# Patient Record
Sex: MALE | Race: WHITE | NOT HISPANIC OR LATINO
[De-identification: names, ages, dates, MRNs, and addresses within clinical notes are randomized per-mention and may not be internally consistent; named-entity substitution may affect disease eponyms.]

---

## 2020-12-30 ENCOUNTER — APPOINTMENT (OUTPATIENT)
Dept: INTERNAL MEDICINE | Facility: CLINIC | Age: 70
End: 2020-12-30
Payer: MEDICARE

## 2020-12-30 ENCOUNTER — TRANSCRIPTION ENCOUNTER (OUTPATIENT)
Age: 70
End: 2020-12-30

## 2020-12-30 VITALS
BODY MASS INDEX: 25.48 KG/M2 | DIASTOLIC BLOOD PRESSURE: 85 MMHG | OXYGEN SATURATION: 97 % | TEMPERATURE: 98.3 F | HEIGHT: 70 IN | WEIGHT: 178 LBS | HEART RATE: 95 BPM | SYSTOLIC BLOOD PRESSURE: 155 MMHG

## 2020-12-30 PROCEDURE — 36415 COLL VENOUS BLD VENIPUNCTURE: CPT

## 2020-12-30 PROCEDURE — 99205 OFFICE O/P NEW HI 60 MIN: CPT | Mod: 25

## 2020-12-31 NOTE — HISTORY OF PRESENT ILLNESS
[de-identified] : 70 year old male patient acme in to [facility] and address HIV disease and other chronic complaints. \par \par patient works as a theater director.\par \par patient complains about increased waist side.

## 2020-12-31 NOTE — PHYSICAL EXAM
[No JVD] : no jugular venous distention [No Lymphadenopathy] : no lymphadenopathy [No Respiratory Distress] : no respiratory distress  [No Accessory Muscle Use] : no accessory muscle use [Soft] : abdomen soft [Non Tender] : non-tender [Normal] : affect was normal and insight and judgment were intact [de-identified] : large abdominal hernia

## 2020-12-31 NOTE — HEALTH RISK ASSESSMENT
[0] : 2) Feeling down, depressed, or hopeless: Not at all (0) [Patient reported colonoscopy was normal] : Patient reported colonoscopy was normal [[id number]] : 0 [ColonoscopyDate] : 08/2020 [ColonoscopyComments] : normal

## 2021-01-05 ENCOUNTER — TRANSCRIPTION ENCOUNTER (OUTPATIENT)
Age: 71
End: 2021-01-05

## 2021-01-05 LAB
ALBUMIN SERPL ELPH-MCNC: 4.4 G/DL
ALP BLD-CCNC: 102 U/L
ALT SERPL-CCNC: 25 U/L
ANION GAP SERPL CALC-SCNC: 14 MMOL/L
APPEARANCE: ABNORMAL
AST SERPL-CCNC: 28 U/L
BACTERIA: NEGATIVE
BILIRUB SERPL-MCNC: 0.6 MG/DL
BILIRUBIN URINE: NEGATIVE
BLOOD URINE: NEGATIVE
BUN SERPL-MCNC: 9 MG/DL
C TRACH RRNA SPEC QL NAA+PROBE: NOT DETECTED
CALCIUM SERPL-MCNC: 9.8 MG/DL
CD3 CELLS # BLD: 827 /UL
CD3 CELLS NFR BLD: 76 %
CD3+CD4+ CELLS # BLD: 361 /UL
CD3+CD4+ CELLS NFR BLD: 33 %
CD3+CD4+ CELLS/CD3+CD8+ CLL SPEC: 0.83 RATIO
CD3+CD8+ CELLS # SPEC: 434 /UL
CD3+CD8+ CELLS NFR BLD: 40 %
CHLORIDE SERPL-SCNC: 102 MMOL/L
CHOLEST SERPL-MCNC: 149 MG/DL
CO2 SERPL-SCNC: 22 MMOL/L
COLOR: YELLOW
CREAT SERPL-MCNC: 0.88 MG/DL
GLUCOSE QUALITATIVE U: NEGATIVE
GLUCOSE SERPL-MCNC: 90 MG/DL
HCV AB SER QL: NONREACTIVE
HCV S/CO RATIO: 0.14 S/CO
HDLC SERPL-MCNC: 70 MG/DL
HIV1 RNA # SERPL NAA+PROBE: NORMAL
HIV1 RNA # SERPL NAA+PROBE: NORMAL COPIES/ML
HYALINE CASTS: 1 /LPF
KETONES URINE: NORMAL
LDLC SERPL CALC-MCNC: 64 MG/DL
LEUKOCYTE ESTERASE URINE: NEGATIVE
MICROSCOPIC-UA: NORMAL
N GONORRHOEA RRNA SPEC QL NAA+PROBE: NOT DETECTED
NITRITE URINE: NEGATIVE
NONHDLC SERPL-MCNC: 79 MG/DL
PH URINE: 5.5
POTASSIUM SERPL-SCNC: 4.6 MMOL/L
PROT SERPL-MCNC: 6.9 G/DL
PROTEIN URINE: NORMAL
RED BLOOD CELLS URINE: 3 /HPF
SODIUM SERPL-SCNC: 138 MMOL/L
SOURCE AMPLIFICATION: NORMAL
SPECIFIC GRAVITY URINE: 1.01
SQUAMOUS EPITHELIAL CELLS: 0 /HPF
T PALLIDUM AB SER QL IA: NEGATIVE
TRIGL SERPL-MCNC: 76 MG/DL
UROBILINOGEN URINE: ABNORMAL
VIRAL LOAD INTERP: NORMAL
VIRAL LOAD LOG: NORMAL LG COP/ML
WHITE BLOOD CELLS URINE: 1 /HPF

## 2021-01-06 ENCOUNTER — TRANSCRIPTION ENCOUNTER (OUTPATIENT)
Age: 71
End: 2021-01-06

## 2021-01-13 ENCOUNTER — TRANSCRIPTION ENCOUNTER (OUTPATIENT)
Age: 71
End: 2021-01-13

## 2021-01-14 ENCOUNTER — OUTPATIENT (OUTPATIENT)
Dept: OUTPATIENT SERVICES | Facility: HOSPITAL | Age: 71
LOS: 1 days | End: 2021-01-14

## 2021-01-14 ENCOUNTER — RESULT REVIEW (OUTPATIENT)
Age: 71
End: 2021-01-14

## 2021-01-14 ENCOUNTER — APPOINTMENT (OUTPATIENT)
Dept: CT IMAGING | Facility: CLINIC | Age: 71
End: 2021-01-14
Payer: SELF-PAY

## 2021-01-14 PROCEDURE — 75571 CT HRT W/O DYE W/CA TEST: CPT | Mod: 26

## 2021-01-19 ENCOUNTER — APPOINTMENT (OUTPATIENT)
Dept: BARIATRICS | Facility: CLINIC | Age: 71
End: 2021-01-19
Payer: MEDICARE

## 2021-01-19 VITALS
TEMPERATURE: 97.6 F | OXYGEN SATURATION: 98 % | WEIGHT: 175.5 LBS | DIASTOLIC BLOOD PRESSURE: 101 MMHG | BODY MASS INDEX: 25.13 KG/M2 | SYSTOLIC BLOOD PRESSURE: 164 MMHG | HEIGHT: 70 IN | HEART RATE: 99 BPM

## 2021-01-19 PROCEDURE — 99072 ADDL SUPL MATRL&STAF TM PHE: CPT

## 2021-01-19 PROCEDURE — 99204 OFFICE O/P NEW MOD 45 MIN: CPT

## 2021-01-26 LAB
HIV GENOSURE ARCHIVE 1: NORMAL
HIV1 PROVIR DNA RT + PR + IN MUT DET SEQ: NORMAL
HIV1 PROVIRAL DNA GENTYP BLD MC NAR: NORMAL

## 2021-01-28 ENCOUNTER — TRANSCRIPTION ENCOUNTER (OUTPATIENT)
Age: 71
End: 2021-01-28

## 2021-01-29 ENCOUNTER — APPOINTMENT (OUTPATIENT)
Dept: NEUROLOGY | Facility: CLINIC | Age: 71
End: 2021-01-29
Payer: MEDICARE

## 2021-01-29 ENCOUNTER — APPOINTMENT (OUTPATIENT)
Dept: INTERNAL MEDICINE | Facility: CLINIC | Age: 71
End: 2021-01-29
Payer: MEDICARE

## 2021-01-29 VITALS — SYSTOLIC BLOOD PRESSURE: 161 MMHG | DIASTOLIC BLOOD PRESSURE: 89 MMHG

## 2021-01-29 VITALS
HEIGHT: 70 IN | SYSTOLIC BLOOD PRESSURE: 162 MMHG | HEART RATE: 103 BPM | TEMPERATURE: 97.7 F | DIASTOLIC BLOOD PRESSURE: 87 MMHG | BODY MASS INDEX: 25.05 KG/M2 | WEIGHT: 175 LBS | OXYGEN SATURATION: 100 %

## 2021-01-29 VITALS
OXYGEN SATURATION: 99 % | SYSTOLIC BLOOD PRESSURE: 160 MMHG | HEART RATE: 96 BPM | WEIGHT: 175 LBS | HEIGHT: 70 IN | TEMPERATURE: 97.7 F | DIASTOLIC BLOOD PRESSURE: 97 MMHG | BODY MASS INDEX: 25.05 KG/M2

## 2021-01-29 DIAGNOSIS — G43.909 MIGRAINE, UNSPECIFIED, NOT INTRACTABLE, W/OUT STATUS MIGRAINOSUS: ICD-10-CM

## 2021-01-29 PROCEDURE — 99214 OFFICE O/P EST MOD 30 MIN: CPT | Mod: 25

## 2021-01-29 PROCEDURE — 99072 ADDL SUPL MATRL&STAF TM PHE: CPT

## 2021-01-29 PROCEDURE — 36415 COLL VENOUS BLD VENIPUNCTURE: CPT

## 2021-01-29 PROCEDURE — 99204 OFFICE O/P NEW MOD 45 MIN: CPT

## 2021-01-29 NOTE — HISTORY OF PRESENT ILLNESS
[FreeTextEntry1] : \par About 10 years ago he began having symptoms that are triggered by cold.\par \par He and his partner have lived in [place] or [place], flying back and forth.\par \par Usually when there is a storm, or some other large weather pattern change, he will develop symptoms.\par \par They have been calling it an autonomic storm, silent migraine or abdominal migraine.\par \par There is a sense that his sinuses are draining.  He projects up a yellow fluid, thick - no nausea and not exactly vomiting except when more severe.  It may be cupful, odorless.  Rarely any food, does not seem gastric. Not painful.  Does not feel faint/lightheaded.\par Some minimal sound sensitivity.\par \par No history of migraines or other headaches.\par Does not feel time loss or have staring spells.\par \par He may become very fatigued when the weather changes occur.\par \par His MD in [place] prescribed CBD oil and tablets.\par \par BP has been a bit high.\par \par He was on HGH for a while for abdominal pain - Dr. Jenkins found him to have a hernia - diastis recti.\par \par

## 2021-01-29 NOTE — PHYSICAL EXAM
[FreeTextEntry1] : General:\par Constitutional:  Sitting comfortably in NAD.\par Psychiatric: well-groomed, appropriate affect, insight/judgment intact\par Ears, Nose, Throat: no abnormalities, mucus membranes moist\par Mallampati: \par Neck: supple, no lymphadenopathy or nodules palpable\par Neck Circumference:\par Cardiovascular: regular rate and rhythm, normal S1/S2, no murmurs \par Chest: Clear to bases. 	Abdomen: soft, non-tender\par Extremities: no edema, clubbing or cyanosis\par Skin:  no rash or neurocutaneous signs \par \par Cognitive:\par Orientation, language, memory and knowledge screens intact.\par Cranial Nerves:\par II: Full to confrontation; disc margins sharp. III/IV/VI: PERRL EOMF No nystagmus\par V1V2V3: Symmetric, VII: Face appears symmetric VIII: Normal to screening, IX/X: Palate Elevates Symmetrical  XI: Trapezius Symmetric  XII: Tongue midline\par Motor:\par Power: 5/5 throughout, tone: normal x 4 limbs, no tremor \par Sensation:\par Intact to light touch. \par Coordination/Gait:\par Finger-nose-finger intact, normal rapid-alternating movements.\par Fine motor normal with normal rapid finger taps and heel-toe tapping \par Narrow based gait, tandem forward and back ok\par Hops well on both feet, heel and toe walking normal \par Reflexes:\par DTR: 1-2+ symmetric x 4 limbs\par

## 2021-01-30 LAB
ESTRADIOL SERPL-MCNC: 27 PG/ML
TESTOST SERPL-MCNC: 498 NG/DL

## 2021-01-30 RX ORDER — TESTOSTERONE 50 MG/5G
50 MG/5GM GEL TRANSDERMAL
Refills: 0 | Status: DISCONTINUED | COMMUNITY
Start: 2020-12-17 | End: 2021-01-29

## 2021-01-30 NOTE — HISTORY OF PRESENT ILLNESS
[de-identified] : 70 year old male patient came in for med refill of glaucoma and discuss abnormal cardiac calcium scoring indicative of CAD and hypogonadism. \par \par

## 2021-02-03 ENCOUNTER — RESULT REVIEW (OUTPATIENT)
Age: 71
End: 2021-02-03

## 2021-02-03 ENCOUNTER — OUTPATIENT (OUTPATIENT)
Dept: OUTPATIENT SERVICES | Facility: HOSPITAL | Age: 71
LOS: 1 days | End: 2021-02-03

## 2021-02-03 ENCOUNTER — APPOINTMENT (OUTPATIENT)
Dept: CT IMAGING | Facility: CLINIC | Age: 71
End: 2021-02-03
Payer: MEDICARE

## 2021-02-03 ENCOUNTER — TRANSCRIPTION ENCOUNTER (OUTPATIENT)
Age: 71
End: 2021-02-03

## 2021-02-03 PROCEDURE — 74177 CT ABD & PELVIS W/CONTRAST: CPT | Mod: 26

## 2021-02-08 ENCOUNTER — NON-APPOINTMENT (OUTPATIENT)
Age: 71
End: 2021-02-08

## 2021-02-08 ENCOUNTER — APPOINTMENT (OUTPATIENT)
Dept: HEART AND VASCULAR | Facility: CLINIC | Age: 71
End: 2021-02-08
Payer: MEDICARE

## 2021-02-08 VITALS
SYSTOLIC BLOOD PRESSURE: 133 MMHG | BODY MASS INDEX: 25.2 KG/M2 | WEIGHT: 176 LBS | OXYGEN SATURATION: 96 % | HEART RATE: 91 BPM | HEIGHT: 70 IN | DIASTOLIC BLOOD PRESSURE: 80 MMHG

## 2021-02-08 PROCEDURE — 93000 ELECTROCARDIOGRAM COMPLETE: CPT

## 2021-02-08 PROCEDURE — 99204 OFFICE O/P NEW MOD 45 MIN: CPT

## 2021-02-08 PROCEDURE — 93306 TTE W/DOPPLER COMPLETE: CPT

## 2021-02-08 PROCEDURE — 99072 ADDL SUPL MATRL&STAF TM PHE: CPT

## 2021-02-08 NOTE — REASON FOR VISIT
[Initial Evaluation] : an initial evaluation of [Coronary Artery Disease] : coronary artery disease [Hyperlipidemia] : hyperlipidemia [Hypertension] : hypertension [FreeTextEntry1] : 70 year old man presents to initiate care. He has been in good health. About 10 years ago, he had a small embolic event to his left eye. He also had a very abnormal Ca score. In addition, he is taking blood pressure and lipid medications. We are discussing cardiac risk stratification and a further work up at this time.

## 2021-02-08 NOTE — DISCUSSION/SUMMARY
[FreeTextEntry1] : CAD, abnormal CA score will add ASA will also add a stress echo for a further risk stratification\par HTN - CONNIE  and I had an extensive discussion regarding his blood pressure management. Patient will continue taking current medications in addition to maintaining a low Na diet, with periodic b/p checks at home.\par HLD CONNIE and I discussed his lipid panel and individualized target LDL goal. At this point, will do diet and exercise with anticipation of re-evaluating labs in 3-6 months\par Carotid disease will get carotid doppler given history of embolic event

## 2021-02-08 NOTE — PHYSICAL EXAM
[General Appearance - Well Developed] : well developed [Normal Appearance] : normal appearance [Well Groomed] : well groomed [General Appearance - Well Nourished] : well nourished [No Deformities] : no deformities [General Appearance - In No Acute Distress] : no acute distress [Normal Conjunctiva] : the conjunctiva exhibited no abnormalities [Eyelids - No Xanthelasma] : the eyelids demonstrated no xanthelasmas [Normal Oral Mucosa] : normal oral mucosa [No Oral Pallor] : no oral pallor [No Oral Cyanosis] : no oral cyanosis [Normal Jugular Venous A Waves Present] : normal jugular venous A waves present [Normal Jugular Venous V Waves Present] : normal jugular venous V waves present [No Jugular Venous Maldonado A Waves] : no jugular venous maldonado A waves [Heart Rate And Rhythm] : heart rate and rhythm were normal [Heart Sounds] : normal S1 and S2 [Murmurs] : no murmurs present [Respiration, Rhythm And Depth] : normal respiratory rhythm and effort [Exaggerated Use Of Accessory Muscles For Inspiration] : no accessory muscle use [Auscultation Breath Sounds / Voice Sounds] : lungs were clear to auscultation bilaterally [Abdomen Soft] : soft [Abdomen Tenderness] : non-tender [Abdomen Mass (___ Cm)] : no abdominal mass palpated [Abnormal Walk] : normal gait [Gait - Sufficient For Exercise Testing] : the gait was sufficient for exercise testing [Nail Clubbing] : no clubbing of the fingernails [Cyanosis, Localized] : no localized cyanosis [Petechial Hemorrhages (___cm)] : no petechial hemorrhages [Skin Color & Pigmentation] : normal skin color and pigmentation [] : no rash [No Venous Stasis] : no venous stasis [Skin Lesions] : no skin lesions [No Skin Ulcers] : no skin ulcer [No Xanthoma] : no  xanthoma was observed [Affect] : the affect was normal [Oriented To Time, Place, And Person] : oriented to person, place, and time [Mood] : the mood was normal [No Anxiety] : not feeling anxious

## 2021-02-24 ENCOUNTER — TRANSCRIPTION ENCOUNTER (OUTPATIENT)
Age: 71
End: 2021-02-24

## 2021-03-02 ENCOUNTER — APPOINTMENT (OUTPATIENT)
Dept: HEART AND VASCULAR | Facility: CLINIC | Age: 71
End: 2021-03-02
Payer: MEDICARE

## 2021-03-02 ENCOUNTER — TRANSCRIPTION ENCOUNTER (OUTPATIENT)
Age: 71
End: 2021-03-02

## 2021-03-02 VITALS
TEMPERATURE: 98.7 F | HEIGHT: 70 IN | SYSTOLIC BLOOD PRESSURE: 131 MMHG | OXYGEN SATURATION: 98 % | DIASTOLIC BLOOD PRESSURE: 75 MMHG | WEIGHT: 176 LBS | BODY MASS INDEX: 25.2 KG/M2 | HEART RATE: 106 BPM

## 2021-03-02 PROCEDURE — 99214 OFFICE O/P EST MOD 30 MIN: CPT

## 2021-03-02 PROCEDURE — 99072 ADDL SUPL MATRL&STAF TM PHE: CPT

## 2021-03-02 PROCEDURE — 93880 EXTRACRANIAL BILAT STUDY: CPT

## 2021-03-02 PROCEDURE — 93351 STRESS TTE COMPLETE: CPT

## 2021-03-02 NOTE — REASON FOR VISIT
[Follow-Up - Clinic] : a clinic follow-up of [Coronary Artery Disease] : coronary artery disease [Hyperlipidemia] : hyperlipidemia [Hypertension] : hypertension [FreeTextEntry1] : Juarez was unable to tolerate beta blocker. He completed a stress echo and a carotid u/s. Both studies were largely unremarkable. His stress is notable for low exercise time. He was also hypertensive at home and during the stress test. We are discussing medical management;

## 2021-03-02 NOTE — DISCUSSION/SUMMARY
[FreeTextEntry1] : HTN will increase enalapril to BID\par HLD CONNIE and I discussed his lipid panel and individualized target LDL goal. At this point, will do diet and exercise with anticipation of re-evaluating labs in 3-6 months\par CAD to cont ASA and re-evaluate

## 2021-03-03 ENCOUNTER — APPOINTMENT (OUTPATIENT)
Dept: INTERNAL MEDICINE | Facility: CLINIC | Age: 71
End: 2021-03-03
Payer: MEDICARE

## 2021-03-03 VITALS
HEART RATE: 100 BPM | BODY MASS INDEX: 25.2 KG/M2 | SYSTOLIC BLOOD PRESSURE: 126 MMHG | DIASTOLIC BLOOD PRESSURE: 75 MMHG | WEIGHT: 176 LBS | OXYGEN SATURATION: 96 % | TEMPERATURE: 98.4 F | HEIGHT: 70 IN

## 2021-03-03 PROCEDURE — 99213 OFFICE O/P EST LOW 20 MIN: CPT | Mod: 25

## 2021-03-03 PROCEDURE — 36415 COLL VENOUS BLD VENIPUNCTURE: CPT

## 2021-03-03 PROCEDURE — 99072 ADDL SUPL MATRL&STAF TM PHE: CPT

## 2021-03-05 ENCOUNTER — TRANSCRIPTION ENCOUNTER (OUTPATIENT)
Age: 71
End: 2021-03-05

## 2021-03-05 LAB
CANCER AG19-9 SERPL-ACNC: 7 U/ML
CEA SERPL-MCNC: 1 NG/ML
PSA SERPL-MCNC: 316 NG/ML

## 2021-03-05 NOTE — HISTORY OF PRESENT ILLNESS
[de-identified] : 70 year old male patient came in for follow up on abnormal CT finding on thoracic spine. \par patient denies any interval change in health, denies rapid weight loss, change of appetite, fevers/night sweats. Patient got a ct cardiac calcium scoring for cardiac health evaluation and his ct scan has showed: patchy sclerotic foci in thoracic spine which osteoblastic mets cant be excluded. ROS is otherwise negative, patient denies pain in the thoracic spine. \par

## 2021-03-05 NOTE — ASSESSMENT
[FreeTextEntry1] : we had a long conversation about the significance and importance of the finding. Patient is up to date on colonoscopy from sep 2020. Will check for tumor markers today and refer to oncology for further evaluation to r/o underlying malignancy.

## 2021-03-08 ENCOUNTER — TRANSCRIPTION ENCOUNTER (OUTPATIENT)
Age: 71
End: 2021-03-08

## 2021-03-09 ENCOUNTER — NON-APPOINTMENT (OUTPATIENT)
Age: 71
End: 2021-03-09

## 2021-03-11 ENCOUNTER — TRANSCRIPTION ENCOUNTER (OUTPATIENT)
Age: 71
End: 2021-03-11

## 2021-03-12 ENCOUNTER — RX RENEWAL (OUTPATIENT)
Age: 71
End: 2021-03-12

## 2021-03-12 ENCOUNTER — TRANSCRIPTION ENCOUNTER (OUTPATIENT)
Age: 71
End: 2021-03-12

## 2021-03-12 LAB
ALBUMIN MFR SERPL ELPH: 60.3 %
ALBUMIN SERPL-MCNC: 4.4 G/DL
ALBUMIN/GLOB SERPL: 1.5 RATIO
ALBUPE: 21.2 %
ALPHA1 GLOB MFR SERPL ELPH: 4.2 %
ALPHA1 GLOB SERPL ELPH-MCNC: 0.3 G/DL
ALPHA1UPE: 34.4 %
ALPHA2 GLOB MFR SERPL ELPH: 10.1 %
ALPHA2 GLOB SERPL ELPH-MCNC: 0.7 G/DL
ALPHA2UPE: 19.5 %
B-GLOBULIN MFR SERPL ELPH: 10.6 %
B-GLOBULIN SERPL ELPH-MCNC: 0.8 G/DL
BETAUPE: 15.6 %
CREAT 24H UR-MCNC: NORMAL G/24 H
CREATININE UR (MAYO): 218 MG/DL
DEPRECATED KAPPA LC FREE/LAMBDA SER: 1.2 RATIO
GAMMA GLOB FLD ELPH-MCNC: 1.1 G/DL
GAMMA GLOB MFR SERPL ELPH: 14.8 %
GAMMAUPE: 9.3 %
IGA 24H UR QL IFE: NORMAL
IGA SER QL IEP: 299 MG/DL
IGG SER QL IEP: 1010 MG/DL
IGM SER QL IEP: 113 MG/DL
INTERPRETATION SERPL IEP-IMP: NORMAL
KAPPA LC 24H UR QL: NORMAL
KAPPA LC CSF-MCNC: 2.3 MG/DL
KAPPA LC SERPL-MCNC: 2.76 MG/DL
M PROTEIN SPEC IFE-MCNC: NORMAL
PROT PATTERN 24H UR ELPH-IMP: NORMAL
PROT SERPL-MCNC: 7.3 G/DL
PROT SERPL-MCNC: 7.3 G/DL
PROT UR-MCNC: 75 MG/DL
PROT UR-MCNC: 75 MG/DL
SPECIMEN VOL 24H UR: NORMAL ML

## 2021-03-13 ENCOUNTER — TRANSCRIPTION ENCOUNTER (OUTPATIENT)
Age: 71
End: 2021-03-13

## 2021-03-15 ENCOUNTER — APPOINTMENT (OUTPATIENT)
Dept: UROLOGY | Facility: CLINIC | Age: 71
End: 2021-03-15
Payer: MEDICARE

## 2021-03-15 VITALS
HEART RATE: 99 BPM | HEIGHT: 70 IN | WEIGHT: 177 LBS | TEMPERATURE: 97.1 F | SYSTOLIC BLOOD PRESSURE: 148 MMHG | DIASTOLIC BLOOD PRESSURE: 88 MMHG | BODY MASS INDEX: 25.34 KG/M2

## 2021-03-15 DIAGNOSIS — R93.7 ABNORMAL FINDINGS ON DIAGNOSTIC IMAGING OF OTHER PARTS OF MUSCULOSKELETAL SYSTEM: ICD-10-CM

## 2021-03-15 PROCEDURE — 99072 ADDL SUPL MATRL&STAF TM PHE: CPT

## 2021-03-15 PROCEDURE — 99204 OFFICE O/P NEW MOD 45 MIN: CPT

## 2021-03-15 RX ORDER — PROPRANOLOL HYDROCHLORIDE 10 MG/1
10 TABLET ORAL
Qty: 120 | Refills: 1 | Status: DISCONTINUED | COMMUNITY
Start: 2021-01-29 | End: 2021-03-15

## 2021-03-16 PROBLEM — R93.7 ABNORMAL CT OF THORACIC SPINE: Status: ACTIVE | Noted: 2021-03-03

## 2021-03-16 NOTE — ASSESSMENT
[FreeTextEntry1] : 70 year old male with PSA of 316 and abnormal BERNABE\par -presumed advanced or metastatic prostate cancer\par -recheck PSA to confirm \par -CT chest, abdomen, and pelvis reviewed with patient, ? thoracic spine lesion\par -no bone scan performed yet\par -will discuss with oncologist PET scan ordered - ?Axumin \par follow up by phone to coordinate care with medical oncology.

## 2021-03-16 NOTE — END OF VISIT
[FreeTextEntry3] : 69yo male with PSA of 300, ?metastasis to thoracic spine. Needs further staging imaging and tissue diagnosis for confirmation. Discussed possible treatment options, ADT plus secondary hormone therapy, chemo if widespread metastatic disease, possible radiation to the prostate if localized or oligometastatic disease. Will coordinate with Med Onc.

## 2021-03-16 NOTE — HISTORY OF PRESENT ILLNESS
[None] : None [FreeTextEntry1] : This is a 70 year old male with PMHx significant for HIV, HTN, and autonomic storms who is being referred today for elevated PSA.  Most recent PSA was 316 on March 3, 2021.  Patient unaware of any previous values.  \par He reports Testosterone replacement for the past 20 years with Androgel.  Latest Testosterone was 498 on 1/29/21.\par He is concerned today the his past use of Egfirta which he took 1 year ago may have contribute to hie elevated PSA.  He started medication due to patient noticing that abdomen was protruding out more and was diagnosed with lipodystrophy. Medication was stopped ~3 months ago after seeing general surgery and was diagnosed with rectus diastasis.\par CT a/p demonstrated no pelvic lymphadenopathy.\par CT chest showed patchy sclerotic foci in the thoracic spine\par \par Alk Phos 102 in January 2021 was previous 80 in August 2020\par No urinary complaints at this time, no bone pain\par \par Dr. Randolph is oncologist that patient has seeing, plan for PET scan if insurance authorized.  If not then directly to biopsy of spine\par \par Family Hx:maternal aunts with breast cancer x 2 and pancreatic cancer\par  [Urinary Incontinence] : no urinary incontinence [Urinary Retention] : no urinary retention [Urinary Urgency] : no urinary urgency [Urinary Frequency] : no urinary frequency [Nocturia] : no nocturia [Straining] : no straining [Weak Stream] : no weak stream [Intermittency] : no intermittency [Post-Void Dribbling] : no post-void dribbling [Dysuria] : no dysuria [Hematuria - Gross] : no gross hematuria [Abdominal Pain] : no abdominal pain [Flank Pain] : no flank pain [Weight Loss] : no recent ~M [unfilled] weight loss [Fever] : no fever [Fatigue] : no fatigue

## 2021-03-16 NOTE — PHYSICAL EXAM
[General Appearance - Well Developed] : well developed [General Appearance - Well Nourished] : well nourished [Normal Appearance] : normal appearance [Well Groomed] : well groomed [General Appearance - In No Acute Distress] : no acute distress [Edema] : no peripheral edema [Respiration, Rhythm And Depth] : normal respiratory rhythm and effort [Exaggerated Use Of Accessory Muscles For Inspiration] : no accessory muscle use [Abdomen Soft] : soft [Abdomen Tenderness] : non-tender [Costovertebral Angle Tenderness] : no ~M costovertebral angle tenderness [Urethral Meatus] : meatus normal [Penis Abnormality] : normal circumcised penis [Urinary Bladder Findings] : the bladder was normal on palpation [Scrotum] : the scrotum was normal [Epididymis] : the epididymides were normal [Testes Mass (___cm)] : there were no testicular masses [Prostate Tenderness] : the prostate was not tender [Normal Station and Gait] : the gait and station were normal for the patient's age [] : no rash [No Focal Deficits] : no focal deficits [Oriented To Time, Place, And Person] : oriented to person, place, and time [Affect] : the affect was normal [Mood] : the mood was normal [Not Anxious] : not anxious [No Palpable Adenopathy] : no palpable adenopathy [FreeTextEntry1] : firm nodule left side of prostate

## 2021-03-19 ENCOUNTER — TRANSCRIPTION ENCOUNTER (OUTPATIENT)
Age: 71
End: 2021-03-19

## 2021-03-20 ENCOUNTER — INPATIENT (INPATIENT)
Facility: HOSPITAL | Age: 71
LOS: 1 days | Discharge: HOME | DRG: 872 | End: 2021-03-22
Attending: HOSPITALIST | Admitting: HOSPITALIST
Payer: MEDICARE

## 2021-03-20 VITALS
RESPIRATION RATE: 18 BRPM | HEART RATE: 125 BPM | OXYGEN SATURATION: 96 % | DIASTOLIC BLOOD PRESSURE: 101 MMHG | WEIGHT: 175.93 LBS | HEIGHT: 70 IN | TEMPERATURE: 98 F | SYSTOLIC BLOOD PRESSURE: 155 MMHG

## 2021-03-20 DIAGNOSIS — N39.0 URINARY TRACT INFECTION, SITE NOT SPECIFIED: ICD-10-CM

## 2021-03-20 DIAGNOSIS — A41.9 SEPSIS, UNSPECIFIED ORGANISM: ICD-10-CM

## 2021-03-20 DIAGNOSIS — B20 HUMAN IMMUNODEFICIENCY VIRUS [HIV] DISEASE: ICD-10-CM

## 2021-03-20 DIAGNOSIS — I10 ESSENTIAL (PRIMARY) HYPERTENSION: ICD-10-CM

## 2021-03-20 DIAGNOSIS — R63.8 OTHER SYMPTOMS AND SIGNS CONCERNING FOOD AND FLUID INTAKE: ICD-10-CM
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
## 2021-03-20 DIAGNOSIS — H40.9 UNSPECIFIED GLAUCOMA: ICD-10-CM

## 2021-03-20 DIAGNOSIS — I25.10 ATHEROSCLEROTIC HEART DISEASE OF NATIVE CORONARY ARTERY WITHOUT ANGINA PECTORIS: ICD-10-CM

## 2021-03-20 DIAGNOSIS — M89.9 DISORDER OF BONE, UNSPECIFIED: ICD-10-CM

## 2021-03-20 DIAGNOSIS — D72.829 ELEVATED WHITE BLOOD CELL COUNT, UNSPECIFIED: ICD-10-CM

## 2021-03-20 DIAGNOSIS — E87.2 ACIDOSIS: ICD-10-CM

## 2021-03-20 LAB
APPEARANCE UR: CLEAR — SIGNIFICANT CHANGE UP
BACTERIA # UR AUTO: PRESENT /HPF
BILIRUB UR-MCNC: ABNORMAL
COLOR SPEC: YELLOW — SIGNIFICANT CHANGE UP
COMMENT - URINE: SIGNIFICANT CHANGE UP
DIFF PNL FLD: ABNORMAL
EPI CELLS # UR: ABNORMAL /HPF (ref 0–5)
GLUCOSE UR QL: NEGATIVE — SIGNIFICANT CHANGE UP
GRAN CASTS # UR COMP ASSIST: ABNORMAL /LPF
HYALINE CASTS # UR AUTO: ABNORMAL /LPF (ref 0–2)
KETONES UR-MCNC: ABNORMAL MG/DL
LACTATE SERPL-SCNC: 1.8 MMOL/L — SIGNIFICANT CHANGE UP (ref 0.5–2)
LACTATE SERPL-SCNC: 2.2 MMOL/L — HIGH (ref 0.5–2)
LEUKOCYTE ESTERASE UR-ACNC: NEGATIVE — SIGNIFICANT CHANGE UP
NITRITE UR-MCNC: POSITIVE
PH UR: 6.5 — SIGNIFICANT CHANGE UP (ref 5–8)
PROT UR-MCNC: 100 MG/DL
RBC CASTS # UR COMP ASSIST: < 5 /HPF — SIGNIFICANT CHANGE UP
SP GR SPEC: 1.02 — SIGNIFICANT CHANGE UP (ref 1–1.03)
UROBILINOGEN FLD QL: 2 E.U./DL
WBC UR QL: < 5 /HPF — SIGNIFICANT CHANGE UP

## 2021-03-20 PROCEDURE — 70450 CT HEAD/BRAIN W/O DYE: CPT | Mod: 26,MG

## 2021-03-20 PROCEDURE — 72131 CT LUMBAR SPINE W/O DYE: CPT | Mod: 26,MG

## 2021-03-20 PROCEDURE — 93010 ELECTROCARDIOGRAM REPORT: CPT

## 2021-03-20 PROCEDURE — G1004: CPT

## 2021-03-20 PROCEDURE — 99223 1ST HOSP IP/OBS HIGH 75: CPT | Mod: GC

## 2021-03-20 PROCEDURE — 72125 CT NECK SPINE W/O DYE: CPT | Mod: 26,MG

## 2021-03-20 PROCEDURE — 71045 X-RAY EXAM CHEST 1 VIEW: CPT | Mod: 26

## 2021-03-20 PROCEDURE — 99285 EMERGENCY DEPT VISIT HI MDM: CPT

## 2021-03-20 PROCEDURE — 72128 CT CHEST SPINE W/O DYE: CPT | Mod: 26,MG

## 2021-03-20 RX ORDER — SODIUM CHLORIDE 9 MG/ML
1000 INJECTION INTRAMUSCULAR; INTRAVENOUS; SUBCUTANEOUS ONCE
Refills: 0 | Status: COMPLETED | OUTPATIENT
Start: 2021-03-20 | End: 2021-03-20

## 2021-03-20 RX ORDER — ENOXAPARIN SODIUM 100 MG/ML
40 INJECTION SUBCUTANEOUS EVERY 24 HOURS
Refills: 0 | Status: DISCONTINUED | OUTPATIENT
Start: 2021-03-20 | End: 2021-03-22

## 2021-03-20 RX ORDER — ASPIRIN/CALCIUM CARB/MAGNESIUM 324 MG
81 TABLET ORAL DAILY
Refills: 0 | Status: DISCONTINUED | OUTPATIENT
Start: 2021-03-21 | End: 2021-03-22

## 2021-03-20 RX ORDER — MORPHINE SULFATE 50 MG/1
4 CAPSULE, EXTENDED RELEASE ORAL ONCE
Refills: 0 | Status: DISCONTINUED | OUTPATIENT
Start: 2021-03-20 | End: 2021-03-20

## 2021-03-20 RX ORDER — OXYCODONE AND ACETAMINOPHEN 5; 325 MG/1; MG/1
1 TABLET ORAL ONCE
Refills: 0 | Status: DISCONTINUED | OUTPATIENT
Start: 2021-03-20 | End: 2021-03-20

## 2021-03-20 RX ORDER — ATORVASTATIN CALCIUM 80 MG/1
40 TABLET, FILM COATED ORAL AT BEDTIME
Refills: 0 | Status: DISCONTINUED | OUTPATIENT
Start: 2021-03-20 | End: 2021-03-22

## 2021-03-20 RX ORDER — OXYCODONE HYDROCHLORIDE 5 MG/1
5 TABLET ORAL EVERY 4 HOURS
Refills: 0 | Status: DISCONTINUED | OUTPATIENT
Start: 2021-03-20 | End: 2021-03-22

## 2021-03-20 RX ORDER — ACETAMINOPHEN 500 MG
650 TABLET ORAL EVERY 6 HOURS
Refills: 0 | Status: DISCONTINUED | OUTPATIENT
Start: 2021-03-20 | End: 2021-03-22

## 2021-03-20 RX ORDER — ACETAMINOPHEN 500 MG
650 TABLET ORAL ONCE
Refills: 0 | Status: COMPLETED | OUTPATIENT
Start: 2021-03-20 | End: 2021-03-20

## 2021-03-20 RX ORDER — MORPHINE SULFATE 50 MG/1
2 CAPSULE, EXTENDED RELEASE ORAL EVERY 4 HOURS
Refills: 0 | Status: DISCONTINUED | OUTPATIENT
Start: 2021-03-20 | End: 2021-03-22

## 2021-03-20 RX ORDER — CEFTRIAXONE 500 MG/1
1000 INJECTION, POWDER, FOR SOLUTION INTRAMUSCULAR; INTRAVENOUS EVERY 24 HOURS
Refills: 0 | Status: DISCONTINUED | OUTPATIENT
Start: 2021-03-21 | End: 2021-03-21

## 2021-03-20 RX ORDER — CEFTRIAXONE 500 MG/1
1000 INJECTION, POWDER, FOR SOLUTION INTRAMUSCULAR; INTRAVENOUS ONCE
Refills: 0 | Status: COMPLETED | OUTPATIENT
Start: 2021-03-20 | End: 2021-03-20

## 2021-03-20 RX ADMIN — MORPHINE SULFATE 4 MILLIGRAM(S): 50 CAPSULE, EXTENDED RELEASE ORAL at 14:33

## 2021-03-20 RX ADMIN — CEFTRIAXONE 100 MILLIGRAM(S): 500 INJECTION, POWDER, FOR SOLUTION INTRAMUSCULAR; INTRAVENOUS at 16:53

## 2021-03-20 RX ADMIN — SODIUM CHLORIDE 1000 MILLILITER(S): 9 INJECTION INTRAMUSCULAR; INTRAVENOUS; SUBCUTANEOUS at 16:01

## 2021-03-20 RX ADMIN — Medication 650 MILLIGRAM(S): at 16:52

## 2021-03-20 RX ADMIN — SODIUM CHLORIDE 1000 MILLILITER(S): 9 INJECTION INTRAMUSCULAR; INTRAVENOUS; SUBCUTANEOUS at 16:49

## 2021-03-20 RX ADMIN — MORPHINE SULFATE 4 MILLIGRAM(S): 50 CAPSULE, EXTENDED RELEASE ORAL at 15:02

## 2021-03-20 RX ADMIN — ENOXAPARIN SODIUM 40 MILLIGRAM(S): 100 INJECTION SUBCUTANEOUS at 22:27

## 2021-03-20 RX ADMIN — OXYCODONE AND ACETAMINOPHEN 1 TABLET(S): 5; 325 TABLET ORAL at 18:12

## 2021-03-20 NOTE — H&P ADULT - NSHPSOCIALHISTORY_GEN_ALL_CORE
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

## 2021-03-20 NOTE — ED ADULT TRIAGE NOTE - OTHER COMPLAINTS
Pt diagnosed with prostate CA last week, has not started chemo/radiation, also found lesion on the spine.

## 2021-03-20 NOTE — ED PROVIDER NOTE - CLINICAL SUMMARY MEDICAL DECISION MAKING FREE TEXT BOX
69 y/o m hx HTN, HLD, HIV on ART presents c/o fall 2 nights ago with mid and low back pain; pt with no neuro deficits on exam, noted to be tachycardic, rectal temp is 100.4.  Labs show mild leukocytosis, cxr negative.  Blood cultures sent, pending u/a and culture.  CT head, cervical spine neg, CT thoracic and lumbar spine pending result.  Pt comfortable in ED after pain medication, will f/u results of CT, urinalysis, consider admission.

## 2021-03-20 NOTE — ED PROVIDER NOTE - ADMIT DISPOSITION PRESENT ON ADMISSION SEPSIS Q3 - REPEAT LACTATE WAS DRAWN
Airway patent, Nasal mucosa clear. Mouth with normal mucosa. Throat has no vesicles, no oropharyngeal exudates and uvula is midline. Repeat lactate was drawn.

## 2021-03-20 NOTE — ED ADULT NURSE NOTE - OBJECTIVE STATEMENT
Pt reports falling out of bed "face down" on Thursday night twice, as per  pt has hx of sleep walking. Pt's  reports "hearing moaning after he fell," no loc, taking ASA 81. Pt has abrasions to left forehead. Pt also reports lower back pain.  Per Pt's spouse, pt is being evaluated for possible prostate cancer.  PT is currently alert and oriented x3, however, spouse reports he is normally "more alert".  Pt currently denies chest pain, SOB, abd pain, /GI symptoms. D/N/V.

## 2021-03-20 NOTE — ED ADULT NURSE REASSESSMENT NOTE - NS ED NURSE REASSESS COMMENT FT1
PT has two abrasions to forehead, one 3cm x 3cm abrasion to right lower ribs, one 2cm diameter abrasion to left posterior elbow.  NO active bleeding. Denies neck pain. POsitive PMS x4 extremities.  Alert and oriented x3.

## 2021-03-20 NOTE — ED PROVIDER NOTE - OBJECTIVE STATEMENT
71 y/o m hx HTN, HLD, HIV on ART presents c/o fall 2 nights ago.  Pt states he doesn't remember what happened, reports being found on the floor twice during the night by his .   stating he found pt on the floor after hearing a noise, he was face down and had urinated on himself, was able to get pt back into bed although reports a similar incident a few hours later without incontinence during the second event.  Pt stating he was having back pain yesterday which gradually worsened and prompted the ED visit today.  Pt stating he has pain to mid and low back with no improvement after taking NSAIDs and muscle relaxant.  Pt stating he is ambulatory today, but "shaky" on his feet.  Pt and  report pt has had falls in the past, hx sleep walking, told by neurologist after outpatient w/u that he had "autonomic storm" which contributed to his symptoms.  Pt denies numbness to lower ext, weakness, headache, dizziness, CP, SOB, fever, chills, n/v/d, all other ROS negative.

## 2021-03-20 NOTE — H&P ADULT - HISTORY OF PRESENT ILLNESS
HPI:  71 y/o m hx HTN, HLD, HIV on ART presents c/o fall 2 nights ago. Pt states that these falls often happen when he is sleep walking (endorsed by  who is at bedside). Pt has no recollection of walking (states that this has happened to him since he was in grade school), reports being found on the floor twice during the night by his .   stating he found pt on the floor after hearing a noise, he was face down and had urinated on himself, was able to get pt back into bed although reports a similar incident a few hours later without incontinence during the second event.  Pt stating he was having back pain yesterday (which he and  attribute to fall) which gradually worsened and prompted the ED visit today.  Pt stating he has pain to mid and low back with no improvement after taking NSAIDs and muscle relaxant, now improved after medications given in the ED. Pt stating he is ambulatory today, but "shaky" on his feet. No numbness or tingling in legs or feet, no pain in legs. Pt and  report pt has had falls in the past, hx sleep walking, told by neurologist after outpatient w/u that he had "autonomic storm" which contributed to his symptoms. Was briefly on propranolol for this however no longer taking because he became very nauseous and dizzy from it. Denies CP, SOB, fever, chills, n/v/d. Denies blood in urine, bloody bowel movements, or abdominal pain.    Of note, as outpatient, patient found to have elevated PSA to the 300s (reportedly). Was supposed to undergo PET this thursday as part of work-up for c/f prostate CA.     In the ED,  VS: T 98.2, , /101, RR 18, SpO2 96% RA  Labs: notable for WBC 13, Hgb 16, BMP wnl, lactate 2.2, alk phos 125, negative trops  Urine: positive nitrites and WBC  EKG: sinus tachycardia  CXR: no focal consolidation appreciated however no official read  Imaging:  CT thoracic spine: Patchy sclerotic foci involving the mid thoracic vertebra from T6 to T9 as above, suspicious for sclerotic bony metastases.  CT lumbar spine: 1.  No acute fracture or traumatic malalignment. 2.  Severe disc height loss at L5-S1.  CT cspine: 1.  No acute fracture or traumatic malalignment. 2.  Multilevel moderate to severe cervical spondylosis as above.  CT head: No acute intracranial hemorrhage or calvarial fracture.  Orders: 1g ceftriaxone, morphine 4mg, 1 x oxycodone, 2L NS, 1x tylenol   HPI:  69 y/o m hx HTN, HLD, HIV (viral load undetectable in dec 2020 with CD4 count then 361) on ART presents c/o fall 2 nights ago. Pt states that these falls often happen when he is sleep walking (endorsed by  who is at bedside). Pt has no recollection of walking (states that this has happened to him since he was in grade school), reports being found on the floor twice during the night by his .   stating he found pt on the floor after hearing a noise, he was face down and had urinated on himself, was able to get pt back into bed although reports a similar incident a few hours later without incontinence during the second event.  Pt stating he was having back pain yesterday (which he and  attribute to fall) which gradually worsened and prompted the ED visit today.  Pt stating he has pain to mid and low back with no improvement after taking NSAIDs and muscle relaxant, now improved after medications given in the ED. Pt stating he is ambulatory today, but "shaky" on his feet. No numbness or tingling in legs or feet, no pain in legs. Pt and  report pt has had falls in the past, hx sleep walking, told by neurologist after outpatient w/u that he had "autonomic storm" which contributed to his symptoms. Was briefly on propranolol for this however no longer taking because he became very nauseous and dizzy from it. Denies CP, SOB, fever, chills, n/v/d. Denies blood in urine, bloody bowel movements, or abdominal pain.    Of note, as outpatient, patient found to have elevated PSA to the 300s (reportedly). Was supposed to undergo PET this thursday as part of work-up for c/f prostate CA.     In the ED,  VS: T 98.2, , /101, RR 18, SpO2 96% RA  Labs: notable for WBC 13, Hgb 16, BMP wnl, lactate 2.2, alk phos 125, negative trops  Urine: positive nitrites and WBC  EKG: sinus tachycardia  CXR: no focal consolidation appreciated however no official read  Imaging:  CT thoracic spine: Patchy sclerotic foci involving the mid thoracic vertebra from T6 to T9 as above, suspicious for sclerotic bony metastases.  CT lumbar spine: 1.  No acute fracture or traumatic malalignment. 2.  Severe disc height loss at L5-S1.  CT cspine: 1.  No acute fracture or traumatic malalignment. 2.  Multilevel moderate to severe cervical spondylosis as above.  CT head: No acute intracranial hemorrhage or calvarial fracture.  Orders: 1g ceftriaxone, morphine 4mg, 1 x oxycodone, 2L NS, 1x tylenol

## 2021-03-20 NOTE — ED PROVIDER NOTE - MUSCULOSKELETAL, MLM
+diffuse thoracic and lumbar spine tenderness with no overlying crepitus, no ecchymosis, range of motion is not limited, no muscle or joint tenderness

## 2021-03-20 NOTE — H&P ADULT - PROBLEM SELECTOR PLAN 5
Likely 2/2 uti  - s/p 2L NS  - f/u repeat lactate Likely 2/2 UTI, however could also be contributed to if underlying malignancy present. No other obvious sources of infection (CXR clear)  - management of uti as above

## 2021-03-20 NOTE — H&P ADULT - ASSESSMENT
69 y/o male with PMH HTN, HLD, HIV on ART (last CD4 count in 12/2020 361 with undetectable HIV viral load in 12/2020) presents c/o chronic falls, with most recent fall 2 nights ago causing back pain in the mid-back, found to have leukocytosis, elevated lactate, and elevated alk phos, admitted for UTI and gait unsteadiness.

## 2021-03-20 NOTE — H&P ADULT - PROBLEM SELECTOR PLAN 6
home med: enalapril 5mg QD  -c/w enalaprin 5mg QD home med: enalapril 5mg QD  -c/w enalapril 5mg QD Likely 2/2 uti  - s/p 2L NS  - f/u repeat lactate Likely 2/2 uti  - s/p 2L NS, repeat lactate improved  - f/u exam tomorrow to determine whether pt needs fluids however does not currently appear overly dry therefore not continuing maintenance fluids

## 2021-03-20 NOTE — ED PROVIDER NOTE - ATTENDING CONTRIBUTION TO CARE
I discussed the plan of care of the patient directly with the PA and examined the patient while in the Emergency Department. I agree with the HPI and PE as documented by the PA.  Pt has h/o htn, hld, hiv, s/p fall out of bed x 2, two nights ago, does not recall either times.  found him face down on ground, incontinent of urine, with abrasion to forehead. Pt was helped back into bed.  found pt on the floor again a few hrs later. Now c/o pain to mid to lower back. Currently being worked up for prostate ca. No relief w/ ibuprofen. No bowel/ bladder dysfunction. No dizziness, cp, sob, palp. Afebrile. Mildly tachycardic to 120bpm. VS otherwise stable. WNWD m in mild painful distress. + ttp to thoracic and lumbar spine. Neuro exam non-focal. EKG showing sinus tach, no st elev/ dep. Labs wnl including trop. CT c-t-l spine and head pending. Will obtain rectal temp and hydrate with ivf. Will continue to monitor.

## 2021-03-20 NOTE — ED ADULT NURSE NOTE - PMH
Bone lesion    CAD (coronary artery disease)    Glaucoma    HIV (human immunodeficiency virus infection)    Hypertension    Hypogonadism in male    Insomnia    Migraine

## 2021-03-20 NOTE — ED ADULT NURSE NOTE - NSIMPLEMENTINTERV_GEN_ALL_ED
Implemented All Fall Risk Interventions:  [place] to call system. Call bell, personal items and telephone within reach. Instruct patient to call for assistance. Room bathroom lighting operational. Non-slip footwear when patient is off stretcher. Physically safe environment: no spills, clutter or unnecessary equipment. Stretcher in lowest position, wheels locked, appropriate side rails in place. Provide visual cue, wrist band, yellow gown, etc. Monitor gait and stability. Monitor for mental status changes and reorient to person, place, and time. Review medications for side effects contributing to fall risk. Reinforce activity limits and safety measures with patient and family.

## 2021-03-20 NOTE — H&P ADULT - PROBLEM SELECTOR PLAN 1
Pt reports falls in the past, especially when sleep walking, however this has occurred more frequently as of late. DDx includes infection (exacerbation in the setting of now UTI), vs. contribution of potential CA given history of bony les Pt reports falls in the past, especially when sleep walking, however this has occurred more frequently as of late. DDx includes infection (exacerbation in the setting of now UTI), vs. contribution of potential CA given history of bony lesions. potential contribution of neurologic pathology given outpatient history of "autonomic storm" for which pt briefly on propranolol, however, less likely compared to aforementioned etiologies  - fall precautions  - CT cspine, lumbar spine, t-spine without any acute fracture  - pain control: tylenol for mild pain, toradol for moderate pain, percocet for severe pain  - work-up for potential malignancy as below  - treatment of uti as per below  - if not improved with treatment of infection, can consider neuro consult  - PT eval Pt reports falls in the past, especially when sleep walking, however this has occurred more frequently as of late. DDx includes infection (exacerbation in the setting of now UTI), vs. contribution of potential CA given history of bony lesions. potential contribution of neurologic pathology given outpatient history of "autonomic storm" for which pt briefly on propranolol, however, less likely compared to aforementioned etiologies  - fall precautions  - CT cspine, lumbar spine, t-spine without any acute fracture  - pain control: tylenol for mild pain, percocet for moderate pain, morphine 2mg IV for severe pain  - work-up for potential malignancy as below  - treatment of uti as per below  - if not improved with treatment of infection, can consider neuro consult  - PT eval  - f/u TSH, B12, folate, RPR to r/o other causes for fall Pt reports falls in the past, especially when sleep walking, however this has occurred more frequently as of late. DDx includes infection (exacerbation in the setting of now UTI), vs. contribution of potential CA given history of bony lesions. potential contribution of neurologic pathology given outpatient history of "autonomic storm" for which pt briefly on propranolol, however, less likely compared to aforementioned etiologies  - fall precautions  - CT cspine, lumbar spine, t-spine without any acute fracture  - pain control: tylenol for mild pain, oxycodone 5mg q4 for moderate pain, morphine 2mg IV for severe pain  - work-up for potential malignancy as below  - treatment of uti as per below  - if not improved with treatment of infection, can consider neuro consult  - VEEG to r/o seizures  - PT eval  - f/u TSH, B12, folate, RPR to r/o other causes for fall Pt with tachycardia, leukocytosis, and source (positive UA) therefore likely SIRS2/4 positive 2/2 UTI  - management of uti as below (ceftriaxone)  - s/p 2L NS  - repeat lactate 1.2 therefore no longer need to trend  - f/u cxr official read to r/o other source of infection (looks clear)

## 2021-03-20 NOTE — H&P ADULT - NSHPLABSRESULTS_GEN_ALL_CORE
LABS:                         16.1   13.98 )-----------( 215      ( 20 Mar 2021 13:30 )             48.3         141  |  103  |  14  ----------------------------<  186<H>  3.8   |  23  |  0.78    Ca    9.4      20 Mar 2021 13:30    TPro  7.4  /  Alb  4.3  /  TBili  1.2  /  DBili  x   /  AST  40  /  ALT  28  /  AlkPhos  125<H>  0320    PT/INR - ( 20 Mar 2021 13:30 )   PT: 14.7 sec;   INR: 1.24          PTT - ( 20 Mar 2021 13:30 )  PTT:29.7 sec  Urinalysis Basic - ( 20 Mar 2021 17:20 )    Color: Yellow / Appearance: Clear / S.025 / pH: x  Gluc: x / Ketone: Trace mg/dL  / Bili: Small / Urobili: 2.0 E.U./dL   Blood: x / Protein: 100 mg/dL / Nitrite: POSITIVE   Leuk Esterase: NEGATIVE / RBC: < 5 /HPF / WBC < 5 /HPF   Sq Epi: x / Non Sq Epi: 5-10 /HPF / Bacteria: Present /HPF      CARDIAC MARKERS ( 20 Mar 2021 13:30 )  x     / 0.01 ng/mL / 171 U/L / x     / 1.2 ng/mL        Lactate, Blood: 1.8 mmol/L ( @ 18:43)  Lactate, Blood: 2.2 mmol/L ( @ 17:06)      RADIOLOGY, EKG & ADDITIONAL TESTS: Reviewed.

## 2021-03-20 NOTE — H&P ADULT - PROBLEM SELECTOR PLAN 3
Pt with no urinary symptoms (no dysuria, hematuria, increased urinary frequency), however tachycardic with leukocytosis and positive nitrites and WBC in urine  - s/p 1g ceftriaxone in ED  - c/w ceftriaxone pending urine culture  - f/u BCx  - f/u repeat lactate Pt with history of lesions found on outpatient imaging in spine that were concerning for possible prostate cancer especially in setting of elevated PSA to the 300s.  - pt scheduled for PET scan outpatient on thursday  - being followed as outpatient, collateral from outpatient physician with regard to PSA  - pain control as above

## 2021-03-20 NOTE — ED ADULT TRIAGE NOTE - CHIEF COMPLAINT QUOTE
Pt reports falling out of bed "face down" on Thursday night twice, as per  pt has hx of sleep walking. Pt's  reports "hearing moaning after he fell," no loc, taking ASA 81. Pt has abrasions to left forehead. Pt also reports lower back pain.

## 2021-03-20 NOTE — H&P ADULT - NSICDXPASTMEDICALHX_GEN_ALL_CORE_FT
PAST MEDICAL HISTORY:  Bone lesion     CAD (coronary artery disease)     Glaucoma     HIV (human immunodeficiency virus infection)     Hypertension     Hypogonadism in male     Insomnia     Migraine

## 2021-03-20 NOTE — H&P ADULT - PROBLEM SELECTOR PLAN 7
home med: rosuvastatin 10mg   - c/w home med home med: enalapril 5mg QD  -c/w enalapril 5mg QD    #CAD- home med rousuvastain 10mg QD, asa 81 QD  -c/w home meds home med: enalapril 5mg QD  -c/w enalapril 5mg QD    ADDENDUM: see [facility] cardiology note and per patient dose increased to bid, may need second agent if BP remains elevated, however will hold on adding a second agent o/n in setting of sepsis.     #CAD- home med rousuvastain 10mg QD, asa 81 QD  -c/w home meds

## 2021-03-20 NOTE — H&P ADULT - PROBLEM SELECTOR PLAN 2
Pt with history of lesions found on outpatient imaging in spine that were concerning for possible prostate cancer especially in setting of elevated PSA to the 300s.  - pt scheduled for PET scan outpatient on thursday  - being followed as outpatient, collateral from outpatient physician with regard to PSA  - pain control as above Pt with tachycardia, leukocytosis, and source (positive UA) therefore likely SIRS2/4 positive 2/2 UTI  - management of uti as below (ceftriaxone)  - s/p 2L NS  - f/u repeat lactate  - f/u cxr official read to r/o other source of infection Pt with tachycardia, leukocytosis, and source (positive UA) therefore likely SIRS2/4 positive 2/2 UTI  - management of uti as below (ceftriaxone)  - s/p 2L NS  - repeat lactate 1.2 therefore no longer need to trend  - f/u cxr official read to r/o other source of infection (looks clear) Pt with no urinary symptoms (no dysuria, hematuria, increased urinary frequency), however tachycardic with leukocytosis and positive nitrites and WBC in urine  - s/p 1g ceftriaxone in ED  - c/w ceftriaxone pending urine culture  - f/u BCx  - f/u repeat lactate

## 2021-03-20 NOTE — H&P ADULT - ATTENDING COMMENTS
reviewed pertinent data , h&p, [facility] notes   patient seen and examined overnight     1. sepsis/ UTI   2. bone lesion concern for malgnancy:        rest of  plan as above reviewed pertinent data , h&p, [facility] notes   patient seen and examined overnight     1. sepsis/ UTI : on ceftriaxone, followup ctxs   2. bone lesion concern for malgnancy: pt seen by PCP, urology and oncologist for bony lesion per [facility] and patient; to undergo PET scan for further workup        rest of  plan as above reviewed pertinent data , h&p, [facility] notes   patient seen and examined overnight     PE findings as above     1. sepsis/ UTI : on ceftriaxone, followup ctxs     2. bone lesion concern for malgnancy: pt seen by PCP, urology and oncologist for bony lesion per [facility] and patient; to undergo PET scan for further workup; suspect from prosatate given PSA elevation    3. frequent falls: cause unclear, PT evaluation pending; agree w/ VEEG, reports childhood seizure once as well sleep walking episodes as a child; has seen neurology started on propranolol for autonomic sxs, which pt stopped 2/2 feeling not well on medication. will likely need further evaluation by neurology likely as outpatient      rest of  plan as above

## 2021-03-20 NOTE — H&P ADULT - PROBLEM SELECTOR PLAN 4
Likely 2/2 UTI, however could also be contributed to if underlying malignancy present. No other obvious sources of infection (CXR clear)  - management of uti as above  - f/u chest xray final read Pt with no urinary symptoms (no dysuria, hematuria, increased urinary frequency), however tachycardic with leukocytosis and positive nitrites and WBC in urine  - s/p 1g ceftriaxone in ED  - c/w ceftriaxone pending urine culture  - f/u BCx  - f/u repeat lactate Pt reports falls in the past, especially when sleep walking, however this has occurred more frequently as of late. DDx includes infection (exacerbation in the setting of now UTI), vs. contribution of potential CA given history of bony lesions. potential contribution of neurologic pathology given outpatient history of "autonomic storm" for which pt briefly on propranolol, however, less likely compared to aforementioned etiologies  - fall precautions  - CT cspine, lumbar spine, t-spine without any acute fracture  - pain control: tylenol for mild pain, oxycodone 5mg q4 for moderate pain, morphine 2mg IV for severe pain  - work-up for potential malignancy as below  - treatment of uti as per below  - if not improved with treatment of infection, can consider neuro consult  - VEEG to r/o seizures  - PT eval  - f/u TSH, B12, folate, RPR to r/o other causes for fall

## 2021-03-20 NOTE — H&P ADULT - NSHPPHYSICALEXAM_GEN_ALL_CORE
VITAL SIGNS:  T(C): 36.9 (03-20-21 @ 18:28), Max: 38 (03-20-21 @ 16:12)  T(F): 98.5 (03-20-21 @ 18:28), Max: 100.4 (03-20-21 @ 16:12)  HR: 120 (03-20-21 @ 18:28) (119 - 125)  BP: 187/110 (03-20-21 @ 18:28) (148/84 - 187/110)  BP(mean): --  RR: 18 (03-20-21 @ 18:28) (18 - 18)  SpO2: 98% (03-20-21 @ 18:28) (96% - 98%)  Wt(kg): --    PHYSICAL EXAM:    Constitutional: WDWN resting comfortably in bed; NAD  Head: NC/AT with exception of superficial laceration on L forehead  Eyes: PERRL, EOMI, clear conjunctiva, L eye slightly more drooped than R which is baseline per  at bedside  ENT: no nasal discharge; MMM  Neck: supple; no JVD  Respiratory: CTA B/L; no W/R/R, no retractions  Cardiac: +S1/S2; regular rhythm, tachycardic, no M/R/G  Gastrointestinal: soft, NT, distended and slightly tense however no rebound or guarding; +BSx4, pt states abd has been distended increasingly since 2018 and has known diastasis recti  Back: spine midline, no bony tenderness or step-offs; no CVAT B/L  Extremities: WWP, no clubbing or cyanosis; no peripheral edema  Musculoskeletal: NROM x4; no joint swelling, tenderness or erythema  Vascular: 2+ radial, PT pulses B/L  Dermatologic: skin warm, dry and intact; no rashes, wounds, or scars (with exception of forehead)  Neurologic: AAOx3; CNII-XII grossly intact; no focal deficits  Psychiatric: affect and characteristics of appearance, verbalizations, behaviors are appropriate VITAL SIGNS:  T(C): 36.9 (03-20-21 @ 18:28), Max: 38 (03-20-21 @ 16:12)  T(F): 98.5 (03-20-21 @ 18:28), Max: 100.4 (03-20-21 @ 16:12)  HR: 120 (03-20-21 @ 18:28) (119 - 125)  BP: 187/110 (03-20-21 @ 18:28) (148/84 - 187/110)  BP(mean): --  RR: 18 (03-20-21 @ 18:28) (18 - 18)  SpO2: 98% (03-20-21 @ 18:28) (96% - 98%)  Wt(kg): --    PHYSICAL EXAM:    Constitutional: WDWN resting comfortably in bed; NAD  Head: NC/AT with exception of superficial laceration on L forehead  Eyes: PERRL, EOMI, clear conjunctiva, L eye slightly more drooped than R which is baseline per  at bedside  ENT: no nasal discharge; MMM  Neck: supple; no JVD  Respiratory: CTA B/L; no W/R/R, no retractions  Cardiac: +S1/S2; regular rhythm, tachycardic, no M/R/G  Gastrointestinal: soft, NT, distended and slightly tense however no rebound or guarding; +BSx4, pt states abd has been distended increasingly since 2018 and has known diastasis recti  Back: spine midline, no bony tenderness or step-offs; no CVAT B/L  Extremities: WWP, no clubbing or cyanosis; no peripheral edema  Musculoskeletal: NROM x4; no joint swelling, tenderness or erythema  Vascular: 2+ radial, PT pulses B/L  Dermatologic: skin warm, dry and intact; no rashes, wounds, or scars (with exception of forehead)  Neurologic: AAOx3; CNII-XII grossly intact; no focal deficits, strength 5/5 in lower ext and upper ext  Psychiatric: affect and characteristics of appearance, verbalizations, behaviors are appropriate

## 2021-03-21 DIAGNOSIS — J90 PLEURAL EFFUSION, NOT ELSEWHERE CLASSIFIED: ICD-10-CM

## 2021-03-21 LAB
ANION GAP SERPL CALC-SCNC: 15 MMOL/L — SIGNIFICANT CHANGE UP (ref 5–17)
BUN SERPL-MCNC: 10 MG/DL — SIGNIFICANT CHANGE UP (ref 7–23)
CALCIUM SERPL-MCNC: 9.4 MG/DL — SIGNIFICANT CHANGE UP (ref 8.4–10.5)
CHLORIDE SERPL-SCNC: 102 MMOL/L — SIGNIFICANT CHANGE UP (ref 96–108)
CO2 SERPL-SCNC: 23 MMOL/L — SIGNIFICANT CHANGE UP (ref 22–31)
CREAT SERPL-MCNC: 0.73 MG/DL — SIGNIFICANT CHANGE UP (ref 0.5–1.3)
D DIMER BLD IA.RAPID-MCNC: 3616 NG/ML DDU — HIGH
FOLATE SERPL-MCNC: 5.4 NG/ML — SIGNIFICANT CHANGE UP
GLUCOSE SERPL-MCNC: 128 MG/DL — HIGH (ref 70–99)
HCT VFR BLD CALC: 45.4 % — SIGNIFICANT CHANGE UP (ref 39–50)
HCV AB S/CO SERPL IA: 0.13 S/CO — SIGNIFICANT CHANGE UP
HCV AB SERPL-IMP: SIGNIFICANT CHANGE UP
HGB BLD-MCNC: 14.8 G/DL — SIGNIFICANT CHANGE UP (ref 13–17)
MAGNESIUM SERPL-MCNC: 1.8 MG/DL — SIGNIFICANT CHANGE UP (ref 1.6–2.6)
MCHC RBC-ENTMCNC: 31.8 PG — SIGNIFICANT CHANGE UP (ref 27–34)
MCHC RBC-ENTMCNC: 32.6 GM/DL — SIGNIFICANT CHANGE UP (ref 32–36)
MCV RBC AUTO: 97.4 FL — SIGNIFICANT CHANGE UP (ref 80–100)
NRBC # BLD: 0 /100 WBCS — SIGNIFICANT CHANGE UP (ref 0–0)
PHOSPHATE SERPL-MCNC: 2 MG/DL — LOW (ref 2.5–4.5)
PLATELET # BLD AUTO: 168 K/UL — SIGNIFICANT CHANGE UP (ref 150–400)
POTASSIUM SERPL-MCNC: 4.2 MMOL/L — SIGNIFICANT CHANGE UP (ref 3.5–5.3)
POTASSIUM SERPL-SCNC: 4.2 MMOL/L — SIGNIFICANT CHANGE UP (ref 3.5–5.3)
RBC # BLD: 4.66 M/UL — SIGNIFICANT CHANGE UP (ref 4.2–5.8)
RBC # FLD: 13.1 % — SIGNIFICANT CHANGE UP (ref 10.3–14.5)
SODIUM SERPL-SCNC: 140 MMOL/L — SIGNIFICANT CHANGE UP (ref 135–145)
TSH SERPL-MCNC: 0.73 UIU/ML — SIGNIFICANT CHANGE UP (ref 0.35–4.94)
VIT B12 SERPL-MCNC: 902 PG/ML — SIGNIFICANT CHANGE UP (ref 232–1245)
WBC # BLD: 14.77 K/UL — HIGH (ref 3.8–10.5)
WBC # FLD AUTO: 14.77 K/UL — HIGH (ref 3.8–10.5)

## 2021-03-21 PROCEDURE — 71275 CT ANGIOGRAPHY CHEST: CPT | Mod: 26

## 2021-03-21 PROCEDURE — 93970 EXTREMITY STUDY: CPT | Mod: 26

## 2021-03-21 PROCEDURE — 99223 1ST HOSP IP/OBS HIGH 75: CPT | Mod: GC

## 2021-03-21 PROCEDURE — 99233 SBSQ HOSP IP/OBS HIGH 50: CPT | Mod: GC

## 2021-03-21 RX ORDER — CEFTRIAXONE 500 MG/1
1000 INJECTION, POWDER, FOR SOLUTION INTRAMUSCULAR; INTRAVENOUS EVERY 24 HOURS
Refills: 0 | Status: DISCONTINUED | OUTPATIENT
Start: 2021-03-21 | End: 2021-03-22

## 2021-03-21 RX ORDER — RILPIVIRINE HYDROCHLORIDE 25 MG/1
25 TABLET, FILM COATED ORAL DAILY
Refills: 0 | Status: DISCONTINUED | OUTPATIENT
Start: 2021-03-21 | End: 2021-03-21

## 2021-03-21 RX ORDER — RILPIVIRINE HYDROCHLORIDE 25 MG/1
25 TABLET, FILM COATED ORAL AT BEDTIME
Refills: 0 | Status: DISCONTINUED | OUTPATIENT
Start: 2021-03-21 | End: 2021-03-22

## 2021-03-21 RX ORDER — DOLUTEGRAVIR SODIUM 25 MG/1
50 TABLET, FILM COATED ORAL AT BEDTIME
Refills: 0 | Status: DISCONTINUED | OUTPATIENT
Start: 2021-03-21 | End: 2021-03-22

## 2021-03-21 RX ORDER — DOLUTEGRAVIR SODIUM 25 MG/1
50 TABLET, FILM COATED ORAL DAILY
Refills: 0 | Status: DISCONTINUED | OUTPATIENT
Start: 2021-03-21 | End: 2021-03-21

## 2021-03-21 RX ORDER — KETOROLAC TROMETHAMINE 30 MG/ML
15 SYRINGE (ML) INJECTION ONCE
Refills: 0 | Status: DISCONTINUED | OUTPATIENT
Start: 2021-03-21 | End: 2021-03-21

## 2021-03-21 RX ADMIN — Medication 81 MILLIGRAM(S): at 14:23

## 2021-03-21 RX ADMIN — Medication 5 MILLIGRAM(S): at 05:32

## 2021-03-21 RX ADMIN — OXYCODONE HYDROCHLORIDE 5 MILLIGRAM(S): 5 TABLET ORAL at 08:18

## 2021-03-21 RX ADMIN — MORPHINE SULFATE 2 MILLIGRAM(S): 50 CAPSULE, EXTENDED RELEASE ORAL at 10:31

## 2021-03-21 RX ADMIN — OXYCODONE HYDROCHLORIDE 5 MILLIGRAM(S): 5 TABLET ORAL at 22:07

## 2021-03-21 RX ADMIN — OXYCODONE HYDROCHLORIDE 5 MILLIGRAM(S): 5 TABLET ORAL at 23:07

## 2021-03-21 RX ADMIN — ENOXAPARIN SODIUM 40 MILLIGRAM(S): 100 INJECTION SUBCUTANEOUS at 21:15

## 2021-03-21 RX ADMIN — ATORVASTATIN CALCIUM 40 MILLIGRAM(S): 80 TABLET, FILM COATED ORAL at 21:15

## 2021-03-21 RX ADMIN — OXYCODONE HYDROCHLORIDE 5 MILLIGRAM(S): 5 TABLET ORAL at 09:18

## 2021-03-21 RX ADMIN — CEFTRIAXONE 100 MILLIGRAM(S): 500 INJECTION, POWDER, FOR SOLUTION INTRAMUSCULAR; INTRAVENOUS at 16:42

## 2021-03-21 RX ADMIN — Medication 5 MILLIGRAM(S): at 18:15

## 2021-03-21 RX ADMIN — MORPHINE SULFATE 2 MILLIGRAM(S): 50 CAPSULE, EXTENDED RELEASE ORAL at 10:45

## 2021-03-21 RX ADMIN — Medication 62.5 MILLIMOLE(S): at 14:25

## 2021-03-21 NOTE — PHYSICAL THERAPY INITIAL EVALUATION ADULT - PERTINENT HX OF CURRENT PROBLEM, REHAB EVAL
69 y/o m hx HTN, HLD, HIV (viral load undetectable in dec 2020 with CD4 count then 361) on ART presents c/o fall 2 nights ago. Pt states that these falls often happen when he is sleep walking (endorsed by  who is at bedside). Pt has no recollection of walking (states that this has happened to him since he was in grade school), reports being found on the floor twice during the night by his . Refer to H&P for further details.

## 2021-03-21 NOTE — PHYSICAL THERAPY INITIAL EVALUATION ADULT - ADDITIONAL COMMENTS
Pt lives in a loft apartment with . Pt denies use of DME prior to admission. Pt states that to falls only happening when he is "sleep walking"

## 2021-03-21 NOTE — CONSULT NOTE ADULT - PROBLEM SELECTOR RECOMMENDATION 2
as above.     PLAN:   - plan for thoracentesis tomorrow afternoon. no need for NPO.   - will send PSA levels as well from pleural effusion. ([email or web address]).     The patient will be s/e/d/ with attending.

## 2021-03-21 NOTE — PROGRESS NOTE ADULT - SUBJECTIVE AND OBJECTIVE BOX
1Patient is a 70y old  Male who presents with a chief complaint of back pain, uti (20 Mar 2021 19:10)      INTERVAL HPI/OVERNIGHT EVENTS:  Patient was seen and examined at bedside. Endorses significant back pain, brianne with any movement. Notes he was supposed to follow up with Pet scan, however came in 2/2 fall. Patient denies: fever, chills, dizziness, weakness, HA, Changes in vision, CP, palpitations, SOB, cough, N/V/D/C, LE edema.      PAST MEDICAL & SURGICAL HISTORY:  Bone lesion    Hypertension    CAD (coronary artery disease)    Hypogonadism in male    Glaucoma    Migraine    Insomnia    HIV (human immunodeficiency virus infection)    No significant past surgical history        T(C): 37 (21 @ 10:26), Max: 38 (21 @ 16:12)  HR: 112 (21 @ 11:01) (109 - 120)  BP: 157/81 (21 @ 11:01) (148/84 - 190/91)  RR: 20 (21 @ 11:01) (17 - 24)  SpO2: 95% (21 @ 11:01) (95% - 98%)  Wt(kg): --  I&O's Summary      PHYSICAL EXAM:  GENERAL: NAD, slightly uncomfortable laying in bed  HEAD:  +L forehead lac, NT to touch, no oozing  EYES: EOMI, PERRLA, L eye slightly more drooped than R which is baseline   ENMT: No tonsillar erythema, exudates, or enlargement; MMM  NECK: Supple, No JVD  NERVOUS SYSTEM:  Alert & Oriented X3, no focal deficits   CHEST/LUNG: Tachypneic, Clear to percussion bilaterally; No rales, rhonchi, wheezing, or rubs  HEART: Tachycardic, Regular rhythm; No murmurs, rubs, or gallops  ABDOMEN: Soft, Nontender, +mild distension; Bowel sounds present  EXTREMITIES:  2+ Peripheral Pulses, No clubbing, cyanosis, or edema  LYMPH: No lymphadenopathy noted      LABS:                        14.8   14.77 )-----------( 168      ( 21 Mar 2021 07:54 )             45.4         140  |  102  |  10  ----------------------------<  128<H>  4.2   |  23  |  0.73    Ca    9.4      21 Mar 2021 07:54  Phos  2.0     -  Mg     1.8     -    TPro  7.4  /  Alb  4.3  /  TBili  1.2  /  DBili  x   /  AST  40  /  ALT  28  /  AlkPhos  125<H>  -20    PT/INR - ( 20 Mar 2021 13:30 )   PT: 14.7 sec;   INR: 1.24          PTT - ( 20 Mar 2021 13:30 )  PTT:29.7 sec  Urinalysis Basic - ( 20 Mar 2021 17:20 )    Color: Yellow / Appearance: Clear / S.025 / pH: x  Gluc: x / Ketone: Trace mg/dL  / Bili: Small / Urobili: 2.0 E.U./dL   Blood: x / Protein: 100 mg/dL / Nitrite: POSITIVE   Leuk Esterase: NEGATIVE / RBC: < 5 /HPF / WBC < 5 /HPF   Sq Epi: x / Non Sq Epi: 5-10 /HPF / Bacteria: Present /HPF      CAPILLARY BLOOD GLUCOSE            Urinalysis Basic - ( 20 Mar 2021 17:20 )    Color: Yellow / Appearance: Clear / S.025 / pH: x  Gluc: x / Ketone: Trace mg/dL  / Bili: Small / Urobili: 2.0 E.U./dL   Blood: x / Protein: 100 mg/dL / Nitrite: POSITIVE   Leuk Esterase: NEGATIVE / RBC: < 5 /HPF / WBC < 5 /HPF   Sq Epi: x / Non Sq Epi: 5-10 /HPF / Bacteria: Present /HPF        MEDICATIONS  (STANDING):  aspirin enteric coated 81 milliGRAM(s) Oral daily  atorvastatin 40 milliGRAM(s) Oral at bedtime  cefTRIAXone   IVPB 1000 milliGRAM(s) IV Intermittent every 24 hours  dolutegravir 50 milliGRAM(s) Oral at bedtime  enalapril 5 milliGRAM(s) Oral two times a day  enoxaparin Injectable 40 milliGRAM(s) SubCutaneous every 24 hours  rilpivirine 25 milliGRAM(s) Oral at bedtime  sodium phosphate IVPB 15 milliMole(s) IV Intermittent once    MEDICATIONS  (PRN):  acetaminophen   Tablet .. 650 milliGRAM(s) Oral every 6 hours PRN Mild Pain (1 - 3)  morphine  - Injectable 2 milliGRAM(s) IV Push every 4 hours PRN Severe Pain (7 - 10)  oxyCODONE    IR 5 milliGRAM(s) Oral every 4 hours PRN Moderate Pain (4 - 6)      RADIOLOGY & ADDITIONAL TESTS:    Imaging Personally Reviewed:  [ ] YES  [ ] NO    Consultant(s) Notes Reviewed:  [ ] YES  [ ] NO    Care Discussed with Consultants/Other Providers [ ] YES  [ ] NO

## 2021-03-21 NOTE — CONSULT NOTE ADULT - PROBLEM SELECTOR RECOMMENDATION 9
On CT chest, patient has b/l pleural effusions almost 3 cm in depth.   on POCUS patient has effusions b/l almost 5-6 cm in depth, simple appearing with no internal septations/echogenic debris. on Ct chest 2 months ago, no effusions were noted.   d/d include malignancy vs CHF exacerbation vs infectious.   Given patient's PSA > 300 and osteoblastic bone lesion high suspicion of metastatic prostate cancer and we need to r/o prostate cancer as etiology for pleural effusion, although unusual for prostate cancer to cause pleural effusions.   No signs of volume overload or pneumonia clinically or on imaging

## 2021-03-22 ENCOUNTER — TRANSCRIPTION ENCOUNTER (OUTPATIENT)
Age: 71
End: 2021-03-22

## 2021-03-22 ENCOUNTER — NON-APPOINTMENT (OUTPATIENT)
Age: 71
End: 2021-03-22

## 2021-03-22 VITALS
HEART RATE: 106 BPM | SYSTOLIC BLOOD PRESSURE: 165 MMHG | DIASTOLIC BLOOD PRESSURE: 88 MMHG | OXYGEN SATURATION: 97 % | RESPIRATION RATE: 18 BRPM | TEMPERATURE: 98 F

## 2021-03-22 PROBLEM — E29.1 TESTICULAR HYPOFUNCTION: Chronic | Status: ACTIVE | Noted: 2021-03-20

## 2021-03-22 PROBLEM — G47.00 INSOMNIA, UNSPECIFIED: Chronic | Status: ACTIVE | Noted: 2021-03-20

## 2021-03-22 PROBLEM — I25.10 ATHEROSCLEROTIC HEART DISEASE OF NATIVE CORONARY ARTERY WITHOUT ANGINA PECTORIS: Chronic | Status: ACTIVE | Noted: 2021-03-20

## 2021-03-22 PROBLEM — H40.9 UNSPECIFIED GLAUCOMA: Chronic | Status: ACTIVE | Noted: 2021-03-20

## 2021-03-22 PROBLEM — M89.9 DISORDER OF BONE, UNSPECIFIED: Chronic | Status: ACTIVE | Noted: 2021-03-20

## 2021-03-22 PROBLEM — I10 ESSENTIAL (PRIMARY) HYPERTENSION: Chronic | Status: ACTIVE | Noted: 2021-03-20

## 2021-03-22 PROBLEM — B20 HUMAN IMMUNODEFICIENCY VIRUS [HIV] DISEASE: Chronic | Status: ACTIVE | Noted: 2021-03-20

## 2021-03-22 PROBLEM — G43.909 MIGRAINE, UNSPECIFIED, NOT INTRACTABLE, WITHOUT STATUS MIGRAINOSUS: Chronic | Status: ACTIVE | Noted: 2021-03-20

## 2021-03-22 LAB
ANION GAP SERPL CALC-SCNC: 14 MMOL/L — SIGNIFICANT CHANGE UP (ref 5–17)
APTT BLD: 31.5 SEC — SIGNIFICANT CHANGE UP (ref 27.5–35.5)
BUN SERPL-MCNC: 10 MG/DL — SIGNIFICANT CHANGE UP (ref 7–23)
CALCIUM SERPL-MCNC: 9.3 MG/DL — SIGNIFICANT CHANGE UP (ref 8.4–10.5)
CHLORIDE SERPL-SCNC: 99 MMOL/L — SIGNIFICANT CHANGE UP (ref 96–108)
CO2 SERPL-SCNC: 22 MMOL/L — SIGNIFICANT CHANGE UP (ref 22–31)
CREAT SERPL-MCNC: 0.71 MG/DL — SIGNIFICANT CHANGE UP (ref 0.5–1.3)
CULTURE RESULTS: NO GROWTH — SIGNIFICANT CHANGE UP
GLUCOSE SERPL-MCNC: 104 MG/DL — HIGH (ref 70–99)
HCT VFR BLD CALC: 44.1 % — SIGNIFICANT CHANGE UP (ref 39–50)
HGB BLD-MCNC: 14.1 G/DL — SIGNIFICANT CHANGE UP (ref 13–17)
INR BLD: 1.09 — SIGNIFICANT CHANGE UP (ref 0.88–1.16)
MAGNESIUM SERPL-MCNC: 1.8 MG/DL — SIGNIFICANT CHANGE UP (ref 1.6–2.6)
MCHC RBC-ENTMCNC: 31.3 PG — SIGNIFICANT CHANGE UP (ref 27–34)
MCHC RBC-ENTMCNC: 32 GM/DL — SIGNIFICANT CHANGE UP (ref 32–36)
MCV RBC AUTO: 98 FL — SIGNIFICANT CHANGE UP (ref 80–100)
NRBC # BLD: 0 /100 WBCS — SIGNIFICANT CHANGE UP (ref 0–0)
PHOSPHATE SERPL-MCNC: 2.1 MG/DL — LOW (ref 2.5–4.5)
PLATELET # BLD AUTO: 173 K/UL — SIGNIFICANT CHANGE UP (ref 150–400)
POTASSIUM SERPL-MCNC: 3.6 MMOL/L — SIGNIFICANT CHANGE UP (ref 3.5–5.3)
POTASSIUM SERPL-SCNC: 3.6 MMOL/L — SIGNIFICANT CHANGE UP (ref 3.5–5.3)
PROTHROM AB SERPL-ACNC: 13 SEC — SIGNIFICANT CHANGE UP (ref 10.6–13.6)
PSA FREE SERPL-MCNC: 47.7 NG/ML
RBC # BLD: 4.5 M/UL — SIGNIFICANT CHANGE UP (ref 4.2–5.8)
RBC # FLD: 13.1 % — SIGNIFICANT CHANGE UP (ref 10.3–14.5)
SODIUM SERPL-SCNC: 135 MMOL/L — SIGNIFICANT CHANGE UP (ref 135–145)
SPECIMEN SOURCE: SIGNIFICANT CHANGE UP
T PALLIDUM AB TITR SER: NEGATIVE — SIGNIFICANT CHANGE UP
WBC # BLD: 11.05 K/UL — HIGH (ref 3.8–10.5)
WBC # FLD AUTO: 11.05 K/UL — HIGH (ref 3.8–10.5)

## 2021-03-22 PROCEDURE — U0003: CPT

## 2021-03-22 PROCEDURE — 85025 COMPLETE CBC W/AUTO DIFF WBC: CPT

## 2021-03-22 PROCEDURE — 84484 ASSAY OF TROPONIN QUANT: CPT

## 2021-03-22 PROCEDURE — 87040 BLOOD CULTURE FOR BACTERIA: CPT

## 2021-03-22 PROCEDURE — 72125 CT NECK SPINE W/O DYE: CPT

## 2021-03-22 PROCEDURE — 82553 CREATINE MB FRACTION: CPT

## 2021-03-22 PROCEDURE — 72128 CT CHEST SPINE W/O DYE: CPT

## 2021-03-22 PROCEDURE — 36415 COLL VENOUS BLD VENIPUNCTURE: CPT

## 2021-03-22 PROCEDURE — 99285 EMERGENCY DEPT VISIT HI MDM: CPT | Mod: 25

## 2021-03-22 PROCEDURE — 87086 URINE CULTURE/COLONY COUNT: CPT

## 2021-03-22 PROCEDURE — 99233 SBSQ HOSP IP/OBS HIGH 50: CPT | Mod: GC

## 2021-03-22 PROCEDURE — 81001 URINALYSIS AUTO W/SCOPE: CPT

## 2021-03-22 PROCEDURE — 83605 ASSAY OF LACTIC ACID: CPT

## 2021-03-22 PROCEDURE — 97161 PT EVAL LOW COMPLEX 20 MIN: CPT

## 2021-03-22 PROCEDURE — 80048 BASIC METABOLIC PNL TOTAL CA: CPT

## 2021-03-22 PROCEDURE — 70450 CT HEAD/BRAIN W/O DYE: CPT

## 2021-03-22 PROCEDURE — 96375 TX/PRO/DX INJ NEW DRUG ADDON: CPT

## 2021-03-22 PROCEDURE — 96374 THER/PROPH/DIAG INJ IV PUSH: CPT

## 2021-03-22 PROCEDURE — U0005: CPT

## 2021-03-22 PROCEDURE — 71045 X-RAY EXAM CHEST 1 VIEW: CPT

## 2021-03-22 PROCEDURE — 86803 HEPATITIS C AB TEST: CPT

## 2021-03-22 PROCEDURE — 86780 TREPONEMA PALLIDUM: CPT

## 2021-03-22 PROCEDURE — 85027 COMPLETE CBC AUTOMATED: CPT

## 2021-03-22 PROCEDURE — 84443 ASSAY THYROID STIM HORMONE: CPT

## 2021-03-22 PROCEDURE — 82746 ASSAY OF FOLIC ACID SERUM: CPT

## 2021-03-22 PROCEDURE — 93970 EXTREMITY STUDY: CPT

## 2021-03-22 PROCEDURE — 84100 ASSAY OF PHOSPHORUS: CPT

## 2021-03-22 PROCEDURE — 85379 FIBRIN DEGRADATION QUANT: CPT

## 2021-03-22 PROCEDURE — 82550 ASSAY OF CK (CPK): CPT

## 2021-03-22 PROCEDURE — 85730 THROMBOPLASTIN TIME PARTIAL: CPT

## 2021-03-22 PROCEDURE — 82607 VITAMIN B-12: CPT

## 2021-03-22 PROCEDURE — 72131 CT LUMBAR SPINE W/O DYE: CPT

## 2021-03-22 PROCEDURE — 80053 COMPREHEN METABOLIC PANEL: CPT

## 2021-03-22 PROCEDURE — 71275 CT ANGIOGRAPHY CHEST: CPT

## 2021-03-22 PROCEDURE — 83735 ASSAY OF MAGNESIUM: CPT

## 2021-03-22 PROCEDURE — 85610 PROTHROMBIN TIME: CPT

## 2021-03-22 PROCEDURE — 82962 GLUCOSE BLOOD TEST: CPT

## 2021-03-22 RX ORDER — ZOLPIDEM TARTRATE 10 MG/1
1 TABLET ORAL
Qty: 0 | Refills: 0 | DISCHARGE

## 2021-03-22 RX ORDER — ROSUVASTATIN CALCIUM 5 MG/1
1 TABLET ORAL
Qty: 0 | Refills: 0 | DISCHARGE

## 2021-03-22 RX ORDER — BRIMONIDINE TARTRATE, TIMOLOL MALEATE 2; 5 MG/ML; MG/ML
2 SOLUTION/ DROPS OPHTHALMIC
Qty: 0 | Refills: 0 | DISCHARGE

## 2021-03-22 RX ORDER — POTASSIUM CHLORIDE 20 MEQ
40 PACKET (EA) ORAL ONCE
Refills: 0 | Status: COMPLETED | OUTPATIENT
Start: 2021-03-22 | End: 2021-03-22

## 2021-03-22 RX ORDER — POLYETHYLENE GLYCOL 3350 17 G/17G
17 POWDER, FOR SOLUTION ORAL DAILY
Refills: 0 | Status: DISCONTINUED | OUTPATIENT
Start: 2021-03-22 | End: 2021-03-22

## 2021-03-22 RX ORDER — SENNA PLUS 8.6 MG/1
2 TABLET ORAL AT BEDTIME
Refills: 0 | Status: DISCONTINUED | OUTPATIENT
Start: 2021-03-22 | End: 2021-03-22

## 2021-03-22 RX ORDER — BRINZOLAMIDE 10 MG/ML
2 SUSPENSION/ DROPS OPHTHALMIC
Qty: 0 | Refills: 0 | DISCHARGE

## 2021-03-22 RX ORDER — OXYCODONE HYDROCHLORIDE 5 MG/1
1 TABLET ORAL
Qty: 21 | Refills: 0
Start: 2021-03-22 | End: 2021-03-28

## 2021-03-22 RX ORDER — MORPHINE SULFATE 50 MG/1
5 CAPSULE, EXTENDED RELEASE ORAL EVERY 4 HOURS
Refills: 0 | Status: DISCONTINUED | OUTPATIENT
Start: 2021-03-22 | End: 2021-03-22

## 2021-03-22 RX ORDER — SODIUM,POTASSIUM PHOSPHATES 278-250MG
1 POWDER IN PACKET (EA) ORAL ONCE
Refills: 0 | Status: COMPLETED | OUTPATIENT
Start: 2021-03-22 | End: 2021-03-22

## 2021-03-22 RX ORDER — DOLUTEGRAVIR SODIUM AND RILPIVIRINE HYDROCHLORIDE 50; 25 MG/1; MG/1
1 TABLET, FILM COATED ORAL
Qty: 0 | Refills: 0 | DISCHARGE

## 2021-03-22 RX ORDER — LATANOPROST 0.05 MG/ML
1 SOLUTION/ DROPS OPHTHALMIC; TOPICAL
Qty: 0 | Refills: 0 | DISCHARGE

## 2021-03-22 RX ORDER — MORPHINE SULFATE 50 MG/1
7.5 CAPSULE, EXTENDED RELEASE ORAL EVERY 4 HOURS
Refills: 0 | Status: DISCONTINUED | OUTPATIENT
Start: 2021-03-22 | End: 2021-03-22

## 2021-03-22 RX ADMIN — Medication 1 PACKET(S): at 09:32

## 2021-03-22 RX ADMIN — Medication 5 MILLIGRAM(S): at 06:05

## 2021-03-22 RX ADMIN — POLYETHYLENE GLYCOL 3350 17 GRAM(S): 17 POWDER, FOR SOLUTION ORAL at 11:45

## 2021-03-22 RX ADMIN — Medication 40 MILLIEQUIVALENT(S): at 09:32

## 2021-03-22 RX ADMIN — Medication 81 MILLIGRAM(S): at 11:45

## 2021-03-22 RX ADMIN — OXYCODONE HYDROCHLORIDE 5 MILLIGRAM(S): 5 TABLET ORAL at 12:56

## 2021-03-22 RX ADMIN — MORPHINE SULFATE 7.5 MILLIGRAM(S): 50 CAPSULE, EXTENDED RELEASE ORAL at 12:56

## 2021-03-22 RX ADMIN — OXYCODONE HYDROCHLORIDE 5 MILLIGRAM(S): 5 TABLET ORAL at 11:45

## 2021-03-22 RX ADMIN — CEFTRIAXONE 100 MILLIGRAM(S): 500 INJECTION, POWDER, FOR SOLUTION INTRAMUSCULAR; INTRAVENOUS at 15:25

## 2021-03-22 RX ADMIN — MORPHINE SULFATE 7.5 MILLIGRAM(S): 50 CAPSULE, EXTENDED RELEASE ORAL at 12:39

## 2021-03-22 RX ADMIN — Medication 5 MILLIGRAM(S): at 17:39

## 2021-03-22 NOTE — DISCHARGE NOTE PROVIDER - PROVIDER TOKENS
PROVIDER:[TOKEN:[[id number]]] PROVIDER:[TOKEN:[[id number]],SCHEDULEDAPPT:[04/02/2021],SCHEDULEDAPPTTIME:[01:40 PM]] PROVIDER:[TOKEN:[[id number]],SCHEDULEDAPPT:[04/02/2021],SCHEDULEDAPPTTIME:[01:40 PM]],PROVIDER:[TOKEN:[[id number]],SCHEDULEDAPPT:[04/01/2021]]

## 2021-03-22 NOTE — PROGRESS NOTE ADULT - PROBLEM SELECTOR PLAN 6
Likely 2/2 UTI, however could also be contributed to if underlying malignancy present. No other obvious sources of infection (CXR clear)  - management of uti as above
Likely 2/2 UTI, however could also be contributed to if underlying malignancy present. No other obvious sources of infection (CXR clear)  - management of uti as above

## 2021-03-22 NOTE — DISCHARGE NOTE PROVIDER - NSDCCPCAREPLAN_GEN_ALL_CORE_FT
PRINCIPAL DISCHARGE DIAGNOSIS  Diagnosis: Fall  Assessment and Plan of Treatment: You presented after a fall. You had imaging of your head and your spine. Your head imaging was within normal, however you were found to have sclerotic lessions in your thoracic spine. You were being followed outpatient by a hematologist/Oncologist and urologist and you were due for a PET-Scan. Please follow up outpatient with both your PCP, your hematologist/oncologist and urologist for further evaluation. You were also seen by physical therapy and are recommended to continue with physical therapy at home.      SECONDARY DISCHARGE DIAGNOSES  Diagnosis: UTI (urinary tract infection)  Assessment and Plan of Treatment: You were noted to have a urinary tract infection (UTI) during your admission to [facility]. This was found based on your symptom profile (urinary frequency and/or pain on urination) as well as your urine tested for any infectious organisms. You were received antibiotics throughout your hospital course. Please follow-up with your primary care physician.Should you notice any symptoms such as but not limited to: unrelenting/severe fever (temperatuer >103 F and/or lasting >3 days), worsening pain on urination, urinary discharge, increased urinary frequency, chills, severe flank/lower back pain, or bloody urine, please return to the emergency department for interval evaluation.      Diagnosis: Pleural effusion  Assessment and Plan of Treatment: You were found to have fluid at the bases of your lungs on CT imaging. You received bedside thoracentisis, this is a procedure to remove the pleural effusion. The fluid was sent to the lab. Please follow up with your PCP about the results.     PRINCIPAL DISCHARGE DIAGNOSIS  Diagnosis: Fall  Assessment and Plan of Treatment: You presented after a fall. You had imaging of your head and your spine. Your head imaging was within normal, however you were found to have sclerotic lessions in your thoracic spine. You were being followed outpatient by a hematologist/Oncologist and urologist and you were due for a PET-Scan. Please follow up outpatient with both your PCP, your hematologist/oncologist and urologist for further evaluation. You were also seen by physical therapy and are recommended to continue with physical therapy at home.      SECONDARY DISCHARGE DIAGNOSES  Diagnosis: UTI (urinary tract infection)  Assessment and Plan of Treatment: You were noted to have a urinary tract infection (UTI) during your admission to [facility]. This was found based on your symptom profile (urinary frequency and/or pain on urination) as well as your urine tested for any infectious organisms. You were received antibiotics throughout your hospital course. Please follow-up with your primary care physician.Should you notice any symptoms such as but not limited to: unrelenting/severe fever (temperatuer >103 F and/or lasting >3 days), worsening pain on urination, urinary discharge, increased urinary frequency, chills, severe flank/lower back pain, or bloody urine, please return to the emergency department for interval evaluation.      Diagnosis: Pleural effusion  Assessment and Plan of Treatment: You were found to have fluid at the bases of your lungs on CT imaging. You were scheduled for bedside thoracentis, however the fluid was to small for the procedure. Please follow up with Dr. Tsai - Pulmnologist for further evaluation.

## 2021-03-22 NOTE — PROGRESS NOTE ADULT - PROBLEM SELECTOR PLAN 9
CD4 361 in 12/2020 and hiv viral load undetectable  -[facility] is home med, can continue here
CD4 361 in 12/2020 and hiv viral load undetectable  -[facility] is home med, can continue here

## 2021-03-22 NOTE — CONSULT NOTE ADULT - SUBJECTIVE AND OBJECTIVE BOX
Patient is a 70y old  Male who presents with a chief complaint of back pain, uti (22 Mar 2021 12:29)       HPI:  HPI:  71 y/o m hx HTN, HLD, HIV (viral load undetectable in dec 2020 with CD4 count then 361) on ART presents c/o fall 2 nights ago. Pt states that these falls often happen when he is sleep walking (endorsed by  who is at bedside). Pt has no recollection of walking (states that this has happened to him since he was in grade school), reports being found on the floor twice during the night by his .   stating he found pt on the floor after hearing a noise, he was face down and had urinated on himself, was able to get pt back into bed although reports a similar incident a few hours later without incontinence during the second event.  Pt stating he was having back pain yesterday (which he and  attribute to fall) which gradually worsened and prompted the ED visit today.  Pt stating he has pain to mid and low back with no improvement after taking NSAIDs and muscle relaxant, now improved after medications given in the ED. Pt stating he is ambulatory today, but "shaky" on his feet. No numbness or tingling in legs or feet, no pain in legs. Pt and  report pt has had falls in the past, hx sleep walking, told by neurologist after outpatient w/u that he had "autonomic storm" which contributed to his symptoms. Was briefly on propranolol for this however no longer taking because he became very nauseous and dizzy from it. Denies CP, SOB, fever, chills, n/v/d. Denies blood in urine, bloody bowel movements, or abdominal pain.    Of note, as outpatient, patient found to have elevated PSA to the 300s (reportedly). Was supposed to undergo PET this thursday as part of work-up for c/f prostate CA.     In the ED,  VS: T 98.2, , /101, RR 18, SpO2 96% RA  Labs: notable for WBC 13, Hgb 16, BMP wnl, lactate 2.2, alk phos 125, negative trops  Urine: positive nitrites and WBC  EKG: sinus tachycardia  CXR: no focal consolidation appreciated however no official read  Imaging:  CT thoracic spine: Patchy sclerotic foci involving the mid thoracic vertebra from T6 to T9 as above, suspicious for sclerotic bony metastases.  CT lumbar spine: 1.  No acute fracture or traumatic malalignment. 2.  Severe disc height loss at L5-S1.  CT cspine: 1.  No acute fracture or traumatic malalignment. 2.  Multilevel moderate to severe cervical spondylosis as above.  CT head: No acute intracranial hemorrhage or calvarial fracture.  Orders: 1g ceftriaxone, morphine 4mg, 1 x oxycodone, 2L NS, 1x tylenol   (20 Mar 2021 19:10)      PAST MEDICAL & SURGICAL HISTORY:  Bone lesion    Hypertension    CAD (coronary artery disease)    Hypogonadism in male    Glaucoma    Migraine    Insomnia    HIV (human immunodeficiency virus infection)    No significant past surgical history        MEDICATIONS  (STANDING):  aspirin enteric coated 81 milliGRAM(s) Oral daily  atorvastatin 40 milliGRAM(s) Oral at bedtime  cefTRIAXone   IVPB 1000 milliGRAM(s) IV Intermittent every 24 hours  dolutegravir 50 milliGRAM(s) Oral at bedtime  enalapril 5 milliGRAM(s) Oral two times a day  enoxaparin Injectable 40 milliGRAM(s) SubCutaneous every 24 hours  polyethylene glycol 3350 17 Gram(s) Oral daily  rilpivirine 25 milliGRAM(s) Oral at bedtime  senna 2 Tablet(s) Oral at bedtime    MEDICATIONS  (PRN):  acetaminophen   Tablet .. 650 milliGRAM(s) Oral every 6 hours PRN Mild Pain (1 - 3)  morphine  IR 7.5 milliGRAM(s) Oral every 4 hours PRN Severe Pain (7 - 10)  oxyCODONE    IR 5 milliGRAM(s) Oral every 4 hours PRN Moderate Pain (4 - 6)        Social History:               -  Home Living Status:  lives with his  in a loft apartment         -  Prior Home Care Services:   X    Baseline Functional Level Prior to Admission:             - ADL's/ IADL's:    patient states he is independent         - ambulatory status PTA:  walked without assistive devices    FAMILY HISTORY:  FH: breast cancer  aunts    Family history of CVA  father        CBC Full  -  ( 22 Mar 2021 07:36 )  WBC Count : 11.05 K/uL  RBC Count : 4.50 M/uL  Hemoglobin : 14.1 g/dL  Hematocrit : 44.1 %  Platelet Count - Automated : 173 K/uL  Mean Cell Volume : 98.0 fl  Mean Cell Hemoglobin : 31.3 pg  Mean Cell Hemoglobin Concentration : 32.0 gm/dL  Auto Neutrophil # : x  Auto Lymphocyte # : x  Auto Monocyte # : x  Auto Eosinophil # : x  Auto Basophil # : x  Auto Neutrophil % : x  Auto Lymphocyte % : x  Auto Monocyte % : x  Auto Eosinophil % : x  Auto Basophil % : x          135  |  99  |  10  ----------------------------<  104<H>  3.6   |  22  |  0.71    Ca    9.3      22 Mar 2021 07:36  Phos  2.1       Mg     1.8         TPro  7.4  /  Alb  4.3  /  TBili  1.2  /  DBili  x   /  AST  40  /  ALT  28  /  AlkPhos  125<H>        Urinalysis Basic - ( 20 Mar 2021 17:20 )    Color: Yellow / Appearance: Clear / S.025 / pH: x  Gluc: x / Ketone: Trace mg/dL  / Bili: Small / Urobili: 2.0 E.U./dL   Blood: x / Protein: 100 mg/dL / Nitrite: POSITIVE   Leuk Esterase: NEGATIVE / RBC: < 5 /HPF / WBC < 5 /HPF   Sq Epi: x / Non Sq Epi: 5-10 /HPF / Bacteria: Present /HPF          Radiology:    < from: Xray Chest 1 View- PORTABLE-Urgent (Xray Chest 1 View- PORTABLE-Urgent .) (21 @ 15:53) >  EXAM:  XR CHEST PORTABLE URGENT 1V                          PROCEDURE DATE:  2021          INTERPRETATION:  Clinical History: Pain    Frontal examination of the chest demonstrates the heart to be within normal limits in transverse diameter. No acute infiltrates. Limited inspiration. Dextroscoliosis thoracic spine. Calcification aortic knob    Impression: No acute infiltrates. Left effusion cannot be excluded      < from: CT Head No Cont (21 @ 15:51) >  EXAM:  CT BRAIN                          PROCEDURE DATE:  2021          INTERPRETATION:  Gladis REECE MD, have reviewed the images and the report and agree with the findings.    INDICATIONS: Status post fall.    TECHNIQUE: Serial axialimages were obtained from the skull base to the vertex without the use of intravenous contrast. Sagittal and coronal reformats were also reviewed.    COMPARISON EXAMINATION: None.    FINDINGS:  VENTRICLES AND SULCI: Frontotemporal predominate parenchymal volume loss. No hydrocephalus.  INTRA-AXIAL: No mass effect, acute hemorrhage, or midline shift. Patchy hypoattenuation of the periventricular white matter, likely secondary to small vessel ischemic disease. No CT evidence of acute transcortical infarction.  EXTRA-AXIAL: No extra-axial fluid collection.  VISUALIZED SINUSES: Clear.  VISUALIZED MASTOIDS: Well-aerated.  CALVARIUM: No acute fracture.  MISCELLANEOUS: Native left lens is absent secondary to prior cataract surgery.    IMPRESSION:  No acute intracranial hemorrhage or calvarial fracture.      < from: CT Cervical Spine No Cont (21 @ 15:55) >  EXAM:  CT CERVICAL SPINE                          PROCEDURE DATE:  2021      < from: CT Cervical Spine No Cont (21 @ 15:55) >  IMPRESSION:  1.  No acute fracture or traumatic malalignment.  2.  Multilevel moderate to severe cervical spondylosis as above.        < from: CT Thoracic Spine No Cont (21 @ 16:06) >  EXAM:  CT THORACIC SPINE                          PROCEDURE DATE:  2021          INTERPRETATION:  PROCEDURE: CT thoracic spine without contrast    INDICATION: Status post fall. Mid back pain.      < from: CT Thoracic Spine No Cont (21 @ 16:06) >  IMPRESSION:    Patchy sclerotic foci involving the mid thoracic vertebra from T6 to T9 as above, suspicious for sclerotic bony metastases.      < from: CT Lumbar Spine No Cont (21 @ 16:06) >  EXAM:  CT LUMBAR SPINE                          PROCEDURE DATE:  2021          INTERPRETATION:  Gladis REECE MD, have reviewed the images and the report and agree with the findings.    PROCEDURE: CT Lumbar spine without contrast    INDICATION: Status post fall.      < from: CT Lumbar Spine No Cont (21 @ 16:06) >    IMPRESSION:  1.  No acute fracture or traumatic malalignment.  2.  Severe disc height loss at L5-S1.          Vital Signs Last 24 Hrs  T(C): 36.9 (22 Mar 2021 05:57), Max: 37.1 (21 Mar 2021 20:40)  T(F): 98.5 (22 Mar 2021 05:57), Max: 98.8 (21 Mar 2021 20:40)  HR: 106 (22 Mar 2021 05:57) (100 - 107)  BP: 165/88 (22 Mar 2021 05:57) (149/80 - 183/90)  BP(mean): --  RR: 18 (22 Mar 2021 05:57) (17 - 18)  SpO2: 97% (22 Mar 2021 05:57) (95% - 97%)    REVIEW OF SYSTEMS: as per HPI        Physical Exam:  WDWN 71 yo  gentleman lying in semi Salinas's position, no acute complaints    Head: normocephalic,  Left forehead lac    Eyes: PERRLA, EOMI, no nystagmus, sclera anicteric, L eyelid droop (chronic)    ENT: nasal discharge, uvula midline, no oropharyngeal erythema/exudate    Neck: supple, negative JVD, negative carotid bruits, no thyromegaly    Chest: CTA bilaterally, neg wheeze/ rhonchi/ rales/ crackles/ egophany    Cardiovascular: regular rate and rhythm, neg murmurs/rubs/gallops    Abdomen: soft, non distended, non tender to palpation in all 4 quadrants, negative rebound/guarding, normal bowel sounds    Extremities: WWP, neg cyanosis/clubbing/edema, negative calf tenderness to palpation, negative Mahesh's sign    Musculoskeletal:    Skin:      :     Neurologic Exam:    Alert and oriented to person, place, date/year, speech fluent w/o dysarthria, follows commands, recent and remote memory intact, repetition intact, comprehension intact,  attention/concentration intact, fund of knowledge appropriate    Cranial Nerves:     II:                         pupils equal, round and reactive to light, visual fields intact   III/ IV/VI:             extraocular movements intact, neg nystagmus, neg ptosis  V:                        facial sensation intact, V1-3 normal  VII:                      face symmetric, no droop, normal eye closure and smile  VIII:                     hearing intact to finger rub bilaterally  IX and X:             no hoarseness, gag intact, palate/ uvula rise symmetrically  XI:                       SCM/ trapezius strength intact bilateral  XII:                      no tongue deviation    Motor Exam:    Right UE:            no focal weakness > 3+/5                             pronator drift: neg    Left UE:                no focal weakness > 3+/5                             pronator drift: neg      Right LE:               no focal weakness > 3+/5    Left LE:                 no focal weakness > 3+/5               Sensation:        Intact to light touch x 4 extremities                                               DTR:                  biceps/brachioradialis: equal bilaterally                                                   patella/ankle: equal bilaterally                                                     neg clonus                           neg Babinski                          Gait:  not tested        PM&R Impression:    1) s/p fall x 2 at home  2) no focal neuro deficits    Plan: cleared to begin rehab    1) Physical therapy focusing on therapeutic exercises, bed mobility/transfer out of bed evaluation, progressive ambulation with assistive devices prn.    2) Anticipated Disposition Plan/Recs:   d/c home                  
PULMONARY SERVICE INITIAL CONSULT NOTE    HPI:  HPI:  69 y/o m hx HTN, HLD, HIV (viral load undetectable in dec 2020 with CD4 count then 361) on ART presents c/o fall 2 nights ago. Pt states that these falls often happen when he is sleep walking (endorsed by  who is at bedside). Pt has no recollection of walking (states that this has happened to him since he was in grade school), reports being found on the floor twice during the night by his .   stating he found pt on the floor after hearing a noise, he was face down and had urinated on himself, was able to get pt back into bed although reports a similar incident a few hours later without incontinence during the second event.  Pt stating he was having back pain yesterday (which he and  attribute to fall) which gradually worsened and prompted the ED visit today.  Pt stating he has pain to mid and low back with no improvement after taking NSAIDs and muscle relaxant, now improved after medications given in the ED. Pt stating he is ambulatory today, but "shaky" on his feet. No numbness or tingling in legs or feet, no pain in legs. Pt and  report pt has had falls in the past, hx sleep walking, told by neurologist after outpatient w/u that he had "autonomic storm" which contributed to his symptoms. Was briefly on propranolol for this however no longer taking because he became very nauseous and dizzy from it. Denies CP, SOB, fever, chills, n/v/d. Denies blood in urine, bloody bowel movements, or abdominal pain.    Of note, as outpatient, patient found to have elevated PSA to the 300s (reportedly).    On my evaluation, the patient is sitting in bed, on room air and saturating well. He denies any dyspnea at rest, but has dyspnea when walking. He doesn't have to stop walking on flat surface due to dyspnea. He denies any fever/chills/phlegm.       REVIEW OF SYSTEMS:  Constitutional: No fever, weight loss or fatigue  Eyes: No eye pain, visual disturbances, or discharge  ENMT:  No difficulty hearing, tinnitus, vertigo; No sinus or throat pain  Neck: No pain, stiffness or neck swelling  Respiratory: see HPI  Cardiovascular: No chest pain, palpitations, dizziness or leg swelling  Gastrointestinal: No abdominal or epigastric pain. No nausea, vomiting or hematemesis; No diarrhea or constipation. No melena or hematochezia.  Genitourinary: No dysuria, frequency, hematuria or incontinence  Neurological: No headaches, memory loss, loss of strength, numbness or tremors  Skin: No itching, burning, rashes or lesions   Lymph Nodes: No enlarged glands  Endocrine: No heat or cold intolerance; No hair loss  Musculoskeletal: No joint pain or swelling; No muscle, back or extremity pain  Psychiatric: No depression, anxiety, mood swings or difficulty sleeping  Heme/Lymph: No easy bruising or bleeding gums  Allergy and Immunologic: No hives or eczema    PAST MEDICAL & SURGICAL HISTORY:  Bone lesion    Hypertension    CAD (coronary artery disease)    Hypogonadism in male    Glaucoma    Migraine    Insomnia    HIV (human immunodeficiency virus infection)    No significant past surgical history        FAMILY HISTORY:  FH: breast cancer  aunts    Family history of CVA  father        MEDICATIONS:  Pulmonary:    Antimicrobials:  cefTRIAXone   IVPB 1000 milliGRAM(s) IV Intermittent every 24 hours  dolutegravir 50 milliGRAM(s) Oral at bedtime  rilpivirine 25 milliGRAM(s) Oral at bedtime    Anticoagulants:  aspirin enteric coated 81 milliGRAM(s) Oral daily  enoxaparin Injectable 40 milliGRAM(s) SubCutaneous every 24 hours    Onc:    GI/:    Endocrine:  atorvastatin 40 milliGRAM(s) Oral at bedtime    Cardiac:  enalapril 5 milliGRAM(s) Oral two times a day    Other Medications:  acetaminophen   Tablet .. 650 milliGRAM(s) Oral every 6 hours PRN  morphine  - Injectable 2 milliGRAM(s) IV Push every 4 hours PRN  oxyCODONE    IR 5 milliGRAM(s) Oral every 4 hours PRN      Allergies    Bactrim (Unknown)  Gluten (Diarrhea)  sulfates (Unknown)    Intolerances        Vital Signs Last 24 Hrs  T(C): 37 (21 Mar 2021 16:52), Max: 37.1 (21 Mar 2021 06:27)  T(F): 98.6 (21 Mar 2021 16:52), Max: 98.7 (21 Mar 2021 06:27)  HR: 102 (21 Mar 2021 16:52) (102 - 120)  BP: 162/84 (21 Mar 2021 16:52) (157/81 - 190/91)  BP(mean): 111 (20 Mar 2021 19:26) (111 - 111)  RR: 17 (21 Mar 2021 16:52) (17 - 24)  SpO2: 96% (21 Mar 2021 16:52) (95% - 98%)        PHYSICAL EXAM:  Constitutional: WDWN  Head: NC/AT  EENT: PERRL, anicteric sclera; oropharynx clear, MMM  Neck: supple, no appreciable JVD  Respiratory: decreased breath sounds b/l bases  Cardiovascular: +S1/S2, RRR  Gastrointestinal: soft, NT/ND; +BSx4  Extremities: WWP; no edema, clubbing or cyanosis  Vascular: 2+ radial, DP/PT pulses B/L  Neurological: AAOx3; no focal deficits    LABS:      CBC Full  -  ( 21 Mar 2021 07:54 )  WBC Count : 14.77 K/uL  RBC Count : 4.66 M/uL  Hemoglobin : 14.8 g/dL  Hematocrit : 45.4 %  Platelet Count - Automated : 168 K/uL  Mean Cell Volume : 97.4 fl  Mean Cell Hemoglobin : 31.8 pg  Mean Cell Hemoglobin Concentration : 32.6 gm/dL  Auto Neutrophil # : x  Auto Lymphocyte # : x  Auto Monocyte # : x  Auto Eosinophil # : x  Auto Basophil # : x  Auto Neutrophil % : x  Auto Lymphocyte % : x  Auto Monocyte % : x  Auto Eosinophil % : x  Auto Basophil % : x    03-21    140  |  102  |  10  ----------------------------<  128<H>  4.2   |  23  |  0.73    Ca    9.4      21 Mar 2021 07:54  Phos  2.0     03-21  Mg     1.8     -    TPro  7.4  /  Alb  4.3  /  TBili  1.2  /  DBili  x   /  AST  40  /  ALT  28  /  AlkPhos  125<H>  03-20    PT/INR - ( 20 Mar 2021 13:30 )   PT: 14.7 sec;   INR: 1.24          PTT - ( 20 Mar 2021 13:30 )  PTT:29.7 sec      Urinalysis Basic - ( 20 Mar 2021 17:20 )    Color: Yellow / Appearance: Clear / S.025 / pH: x  Gluc: x / Ketone: Trace mg/dL  / Bili: Small / Urobili: 2.0 E.U./dL   Blood: x / Protein: 100 mg/dL / Nitrite: POSITIVE   Leuk Esterase: NEGATIVE / RBC: < 5 /HPF / WBC < 5 /HPF   Sq Epi: x / Non Sq Epi: 5-10 /HPF / Bacteria: Present /HPF                RADIOLOGY & ADDITIONAL STUDIES:

## 2021-03-22 NOTE — PROGRESS NOTE ADULT - PROBLEM SELECTOR PLAN 7
Likely 2/2 uti  Resolved    #sinus tachycardia   PE r/o   May be 2/2 pain, continue with pain management   Obtain repeat EKG
Likely 2/2 uti  Resolved    #sinus tachycardia   PE r/o   May be 2/2 pain, continue with pain management   Obtain repeat EKG

## 2021-03-22 NOTE — DISCHARGE NOTE PROVIDER - NSDCMRMEDTOKEN_GEN_ALL_CORE_FT
Azopt 1% ophthalmic suspension: 2 drop(s) to each affected eye once a day  Combigan 0.2%-0.5% ophthalmic solution: 2 drop(s) to each affected eye once a day  enalapril 5 mg oral tablet: 1 tab(s) orally once a day  rosuvastatin 10 mg oral tablet: 1 tab(s) orally once a day  Xalatan 0.005% ophthalmic solution: 1 drop(s) to each affected eye once a day (in the evening)  zolpidem 10 mg oral tablet: 1 tab(s) orally once a day (at bedtime)   Azopt 1% ophthalmic suspension: 2 drop(s) to each affected eye once a day  Combigan 0.2%-0.5% ophthalmic solution: 2 drop(s) to each affected eye once a day  enalapril 5 mg oral tablet: 1 tab(s) orally once a day  Juluca 50 mg-25 mg oral tablet: 1 tab(s) orally once a day  rosuvastatin 10 mg oral tablet: 1 tab(s) orally once a day  Xalatan 0.005% ophthalmic solution: 1 drop(s) to each affected eye once a day (in the evening)  zolpidem 10 mg oral tablet: 1 tab(s) orally once a day (at bedtime)   Azopt 1% ophthalmic suspension: 2 drop(s) to each affected eye once a day  Combigan 0.2%-0.5% ophthalmic solution: 2 drop(s) to each affected eye once a day  enalapril 5 mg oral tablet: 1 tab(s) orally once a day  Juluca 50 mg-25 mg oral tablet: 1 tab(s) orally once a day  oxyCODONE 5 mg oral tablet: 1 tab(s) orally every 8 hours, As Needed MDD:3 TABS   rosuvastatin 10 mg oral tablet: 1 tab(s) orally once a day  Xalatan 0.005% ophthalmic solution: 1 drop(s) to each affected eye once a day (in the evening)  zolpidem 10 mg oral tablet: 1 tab(s) orally once a day (at bedtime)

## 2021-03-22 NOTE — PROGRESS NOTE ADULT - ASSESSMENT
The  patient is a 71 y/o m hx HTN, HLD, HIV (viral load undetectable in dec 2020 with CD4 count then 361) on ART who came to hospital due to frequent falls and worsening back pain, incidental finding of b/l pleural effusion, consulted is for diagnostic thoracentesis     Pleural effusion  On CT chest, patient has b/l pleural effusions, on POCUS patient has effusions b/l simple appearing with no internal septations/echogenic debris. These are new effusions  no effusions were noted on imaging 2 months ago. d/d include malignancy vs CHF exacerbation vs infectious. Patient does not look septic or volume overloaded. Patient was being worked up for sclerotic bony lesions outpatient, pending insurance clearance for PET CT , he was found to have PSA > 300. Patient was on testosterone for years. These osteoblastic bone lesion high suspicion of metastatic prostate cancer and we need to r/o prostate cancer as etiology for pleural effusion, although unusual for prostate cancer to cause pleural effusions.   - Plan for thoracentesis today , patient wants to wait for his  to be here for the procedure and also wants to talk to his oncologist before procedure.   - This is not an urgent procedure so we will see if he is ready this afternoon. Does not need to be NPO.   - Hold DVT prophylaxis before the procedure.       The patient will be discussed with Dr Tsai.     
71 y/o male with PMH HTN, HLD, HIV on ART (last CD4 count in 12/2020 361 with undetectable HIV viral load in 12/2020) presents c/o chronic falls. Pt admitted for UTI and gait unsteadiness.
69 y/o male with PMH HTN, HLD, HIV on ART (last CD4 count in 12/2020 361 with undetectable HIV viral load in 12/2020) presents c/o chronic falls. Pt admitted for UTI and gait unsteadiness.

## 2021-03-22 NOTE — DISCHARGE NOTE PROVIDER - CARE PROVIDERS DIRECT ADDRESSES
,[email or web address]

## 2021-03-22 NOTE — DISCHARGE NOTE PROVIDER - HOSPITAL COURSE
71 y/o male with PMH HTN, HLD, HIV on ART (last CD4 count in 12/2020 361 with undetectable HIV viral load in 12/2020) presents c/o chronic falls. Pt admitted for UTI and gait unsteadiness.  Problem List/Main Diagnoses (system-based):      Problem/Plan - 1:  ·  Problem: Sepsis.  Plan: POA- with tachycardia, leukocytosis, and source (positive UA)  Cw CTX   F/U Blood cx, Ucx.      Problem/Plan - 2:  ·  Problem: UTI (urinary tract infection).  Plan: - s/p 1g ceftriaxone in ED  - c/w ceftriaxone pending urine culture  - f/u BCx.      Problem/Plan - 3:  ·  Problem: Pleural effusion.  Plan: Patient with tachypnea, tachycardia  Elevated D-dimer  CT PE ordered to r/o PE in setting of tachycardia and tachypnea, found to have b/l pleural effusion  Pulm consult for eval for possible therapeutic tap  Encourage OOB, IS.      Problem/Plan - 4:  ·  Problem: Bone lesion.  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      Problem/Plan - 5:  ·  Problem: Fall.  Plan: Pt reports falls in the past, especially when sleep walking, however this has occurred more frequently as of late. DDx includes infection (exacerbation in the setting of now UTI), vs. contribution of potential CA given history of bony lesions. potential contribution of neurologic pathology given outpatient history of "autonomic storm" for which pt briefly on propranolol, however, less likely compared to aforementioned etiologies  - fall precautions  - CT cspine, lumbar spine, t-spine without any acute fracture  - pain controln  - PT eval  - f/u TSH, B12, folate, RPR to r/o other causes for fall.      Problem/Plan - 6:  Problem: Leukocytosis. Plan: Likely 2/2 UTI, however could also be contributed to if underlying malignancy present. No other obvious sources of infection (CXR clear)  - management of uti as above.     Problem/Plan - 7:  ·  Problem: Lactic acidosis.  Plan: Likely 2/2 uti  Resolved    #sinus tachycardia   PE r/o   May be 2/2 pain, continue with pain management   Obtain repeat EKG.      Problem/Plan - 8:  ·  Problem: Hypertension.  Plan: home med: enalapril 5mg QD  -c/w enalapril 5mg BID, increase prn, may need second agent if BP remains elevated, however will hold on adding a second agent o/n in setting of sepsis.     #CAD- home med rousuvastain 10mg QD, asa 81 QD  -c/w home meds.      Problem/Plan - 9:  ·  Problem: HIV (human immunodeficiency virus infection).  Plan: CD4 361 in 12/2020 and hiv viral load undetectable  -Juluca is home med, can continue here.      Problem/Plan - 10:  Problem: Glaucoma. Plan; home meds: latanoprost 1x QHS, combigan 2 drops daily, and azopt 1% 1 drop q12  - c/w home meds.    Inpatient treatment course: See as above   New medications: None   Labs to be followed outpatient: Pleural fluid analysis   Exam to be followed outpatient: PET Scan    71 y/o male with PMH HTN, HLD, HIV on ART (last CD4 count in 12/2020 361 with undetectable HIV viral load in 12/2020) presents c/o chronic falls. Pt admitted for UTI and gait unsteadiness.  Problem List/Main Diagnoses (system-based):     Sepsis   - POA- with tachycardia, leukocytosis, and source (positive UA)  -Completed 3 days of CTX  -Blood cx, Ucx NGTD.     UTI (urinary tract infection).    s/p 1g ceftriaxone in ED  - Completed 3days of CTX  - Ucx NGTD  - BCx NGTD.     Pleural effusion.   Patient with tachypnea, tachycardia  Elevated D-dimer  CT PE ordered to r/o PE in setting of tachycardia and tachypnea, found to have b/l pleural effusion  -Bedside US did not show CHF or pericardial fluid   -Pulm consult for possible thoracentesis   Encourage OOB, IS.     sinus tachycardia   -CTPE negative for PE   May be 2/2 pain, continue with pain management    Bone lesion.    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 VELAZCO following.     Fall.   Pt reports falls in the past, especially when sleep walking, however this has occurred more frequently as of late. DDx includes infection (exacerbation in the setting of now UTI), vs. contribution of potential CA given history of bony lesions. potential contribution of neurologic pathology given outpatient history of "autonomic storm" for which pt briefly on propranolol, however, less likely compared to aforementioned etiologies  - fall precautions  - CT cspine, lumbar spine, t-spine without any acute fracture  - pain controln  - PT eval recs for home with home PT   - TSH, B12, folate wnl,  RPR non reactive.     Leukocytosis.  Likely 2/2 UTI, however could also be contributed to if underlying malignancy present. No other obvious sources of infection (CXR clear)  - management of uti as above.    Lactic acidosis-Resolved   Likely 2/2 uti    Hypertension.  home med: enalapril 5mg QD  -c/w enalapril 5mg BID, increase prn, may need second agent if BP remains elevated, however will hold on adding a second agent o/n in setting of sepsis.     CAD  -home med rousuvastain 10mg QD, asa 81 QD  -c/w home meds.     HIV (human immunodeficiency virus infection).  CD4 361 in 12/2020 and hiv viral load undetectable  -Juluca is home med, can continue here.     Glaucoma.  Home meds: latanoprost 1x QHS, combigan 2 drops daily, and azopt 1% 1 drop q12  - c/w home meds.      Inpatient treatment course: See as above   New medications: ________________pain medications   Labs to be followed outpatient: Pleural fluid analysis   Exam to be followed outpatient: PET Scan    71 y/o male with PMH HTN, HLD, HIV on ART (last CD4 count in 12/2020 361 with undetectable HIV viral load in 12/2020) presents c/o chronic falls. Pt admitted for UTI and gait unsteadiness.  Problem List/Main Diagnoses (system-based):     Sepsis   - POA- with tachycardia, leukocytosis, and source (positive UA)  -Completed 3 days of CTX  -Blood cx, Ucx NGTD.     UTI (urinary tract infection).    s/p 1g ceftriaxone in ED  - Completed 3days of CTX  - Ucx NGTD  - BCx NGTD.     Pleural effusion.   Patient with tachypnea, tachycardia  Elevated D-dimer  CT PE ordered to r/o PE in setting of tachycardia and tachypnea, found to have b/l pleural effusion  -Bedside US did not show CHF or pericardial fluid   -Pulm consult for possible thoracentesis   Encourage OOB, IS.     sinus tachycardia   -CTPE negative for PE   May be 2/2 pain, continue with pain management    Bone lesion.    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 VELAZCO following.     Fall.   Pt reports falls in the past, especially when sleep walking, however this has occurred more frequently as of late. DDx includes infection (exacerbation in the setting of now UTI), vs. contribution of potential CA given history of bony lesions. potential contribution of neurologic pathology given outpatient history of "autonomic storm" for which pt briefly on propranolol, however, less likely compared to aforementioned etiologies  - fall precautions  - CT cspine, lumbar spine, t-spine without any acute fracture  - pain controln  - PT eval recs for home with home PT   - TSH, B12, folate wnl,  RPR non reactive.     Leukocytosis.  Likely 2/2 UTI, however could also be contributed to if underlying malignancy present. No other obvious sources of infection (CXR clear)  - management of uti as above.    Lactic acidosis-Resolved   Likely 2/2 uti    Hypertension.  home med: enalapril 5mg QD  -c/w enalapril 5mg BID, increase prn, may need second agent if BP remains elevated, however will hold on adding a second agent o/n in setting of sepsis.     CAD  -home med rousuvastain 10mg QD, asa 81 QD  -c/w home meds.     HIV (human immunodeficiency virus infection).  CD4 361 in 12/2020 and hiv viral load undetectable  -Juluca is home med, can continue here.     Glaucoma.  Home meds: latanoprost 1x QHS, combigan 2 drops daily, and azopt 1% 1 drop q12  - c/w home meds.      Inpatient treatment course: See as above   New medications: Pain medications   Labs to be followed outpatient: Pleural fluid analysis   Exam to be followed outpatient: PET Scan    71 y/o male with PMH HTN, HLD, HIV on ART (last CD4 count in 12/2020 361 with undetectable HIV viral load in 12/2020) presents c/o chronic falls. Pt admitted for UTI and gait unsteadiness.  Problem List/Main Diagnoses (system-based):     Sepsis   - POA- with tachycardia, leukocytosis, and source (positive UA)  -Completed 3 days of CTX  -Blood cx, Ucx NGTD.     UTI (urinary tract infection).    s/p 1g ceftriaxone in ED  - Completed 3days of CTX  - Ucx NGTD  - BCx NGTD.     Pleural effusion.   Patient with tachypnea, tachycardia  Elevated D-dimer  CT PE ordered to r/o PE in setting of tachycardia and tachypnea, found to have b/l pleural effusion  -Bedside US did not show CHF or pericardial fluid   -Pulm consult for possible thoracentesis, to small to tap, will follow with Dr. Tsai outpatient.    Encourage OOB, IS.     sinus tachycardia   -CTPE negative for PE   May be 2/2 pain, continue with pain management    Bone lesion.    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 VELAZCO following.     Fall.   Pt reports falls in the past, especially when sleep walking, however this has occurred more frequently as of late. DDx includes infection (exacerbation in the setting of now UTI), vs. contribution of potential CA given history of bony lesions. potential contribution of neurologic pathology given outpatient history of "autonomic storm" for which pt briefly on propranolol, however, less likely compared to aforementioned etiologies  - fall precautions  - CT cspine, lumbar spine, t-spine without any acute fracture  - pain controln  - PT eval recs for home with home PT   - TSH, B12, folate wnl,  RPR non reactive.     Leukocytosis.  Likely 2/2 UTI, however could also be contributed to if underlying malignancy present. No other obvious sources of infection (CXR clear)  - management of uti as above.    Lactic acidosis-Resolved   Likely 2/2 uti    Hypertension.  home med: enalapril 5mg QD  -c/w enalapril 5mg BID, increase prn, may need second agent if BP remains elevated, however will hold on adding a second agent o/n in setting of sepsis.     CAD  -home med rousuvastain 10mg QD, asa 81 QD  -c/w home meds.     HIV (human immunodeficiency virus infection).  CD4 361 in 12/2020 and hiv viral load undetectable  -Juluca is home med, can continue here.     Glaucoma.  Home meds: latanoprost 1x QHS, combigan 2 drops daily, and azopt 1% 1 drop q12  - c/w home meds.      Inpatient treatment course: See as above   New medications: Pain medications   Labs to be followed outpatient: Possible Tap   Exam to be followed outpatient: PET Scan

## 2021-03-22 NOTE — PROGRESS NOTE ADULT - SUBJECTIVE AND OBJECTIVE BOX
Patient was seen and examined by me at bedside. I agree with resident's note, subjective, objective physical exam, assessment and plan with following modifications/additions.    Greater than 35 minutes spent on total encounter; more than 50% of the visit was spent counseling and/or coordinating care by the attending physician.    69 y/o male with PMH HTN, HLD, outpt w/u for prostate CA, well controlled HIV on ART presents with chronic falls no fractures on imaging, persistent pain but no intact neuro exam but consistent pain. Imaging incidentally noted spinal lesions c/f metastatic prostate ca and bilateral pleural effusions without any overt CHF symptoms and normal appearing EF per bedside TTE, c/f malignant effusions though rare for prostate ca. Initial concern for UTI but no WBC on urine and urine cx negative patient asymptomatic hold off further treatment s/p 3 doses abx here.   -Pending possible thora by pulm today, low c/f CHF per exam as appears dry and per bedside TTE (collapseable IVC, intact appearing systolic function)   -Has close heme/onc f/u as outpt (Dr. NOGUEIRA)- will inform hematologist about findings to to f/u cytology, outpt PET already scheduled   -Severe back pain s/p fall, now low c/f cord involvement per exam and imaging neg for fracture, will trial po opioid regimen to allow for pain management at home. Per PT ok for home with home PT  -Dispo- possibly today s/p thora if stable pain control on short course po opioids and close heme/onc and PCP f/u (reached out to both)     Mark Scott MD [phone]  Patient was seen and examined by me at bedside. I agree with resident's note, subjective, objective physical exam, assessment and plan with following modifications/additions.    Greater than 35 minutes spent on total encounter; more than 50% of the visit was spent counseling and/or coordinating care by the attending physician.    Spoke with patient and his  and patient's PCP to obtain collateral     69 y/o male with PMH HTN, HLD, recent diagnosis of prostate CA, well controlled HIV on ART, hx of chronic falls from sleep walking presents s/p fall, no fractures on imaging, persistent pain but no intact neuro exam. Imaging incidentally noted spinal lesions c/f metastatic prostate ca and bilateral pleural effusions. Confirmed with PCP normal recent echo, possible malignant effusions though rare for prostate ca, also on ddx would be hemothorax though atypical as bilateral and Hg stable. Regardless, spoke to family and PCP and plan to do diagnostic thora on one side and send for cytology. Initial concern for UTI but no WBC on urine and urine cx negative patient asymptomatic hold off further treatment s/p 3 doses abx here.   -Thora today and send for cytology any fluid drained, ensure no blood  -Has close heme/onc f/u as outpt (Dr. NOGUEIRA)- hematologist to have PET done this week, and aware of this admission and findings  -Severe back pain s/p fall, now low c/f cord involvement per exam and imaging neg for fracture, pain controlled on po opioids and ok per PT for dc home- patient wanting to be home today so will work to send home after thora if stable  -Dispo- possibly today with close PCP and onc followup.     Mark Scott MD [phone]

## 2021-03-22 NOTE — PROGRESS NOTE ADULT - PROBLEM SELECTOR PLAN 5
Pt reports falls in the past, especially when sleep walking, however this has occurred more frequently as of late. DDx includes infection (exacerbation in the setting of now UTI), vs. contribution of potential CA given history of bony lesions. potential contribution of neurologic pathology given outpatient history of "autonomic storm" for which pt briefly on propranolol, however, less likely compared to aforementioned etiologies  - fall precautions  - CT cspine, lumbar spine, t-spine without any acute fracture  - pain controln  - PT eval recs for home with home PT   - TSH, B12, folate wnl,  RPR non reactive
Pt reports falls in the past, especially when sleep walking, however this has occurred more frequently as of late. DDx includes infection (exacerbation in the setting of now UTI), vs. contribution of potential CA given history of bony lesions. potential contribution of neurologic pathology given outpatient history of "autonomic storm" for which pt briefly on propranolol, however, less likely compared to aforementioned etiologies  - fall precautions  - CT cspine, lumbar spine, t-spine without any acute fracture  - pain controln  - PT eval  - f/u TSH, B12, folate, RPR to r/o other causes for fall

## 2021-03-22 NOTE — DISCHARGE NOTE PROVIDER - CARE PROVIDER_API CALL
Cori Jenkins)  [place] [address]  Phone: [phone]  Fax: [phone]  Follow Up Time:    [place] [address]  Phone: [phone]  Fax: [phone]  Scheduled Appointment: 04/02/2021 01:40 PM   Cori Jenkins)  [address] [address]  Phone: [phone]  Fax: [phone]  Scheduled Appointment: 04/02/2021 01:40 PM    Al Tsai)  Critical Care Medicine; Internal Medicine; Pulmonary Disease  [address] [place] [address]  Phone: [phone]  Fax: [phone]  Scheduled Appointment: 04/01/2021

## 2021-03-22 NOTE — PROGRESS NOTE ADULT - PROBLEM SELECTOR PLAN 8
home med: enalapril 5mg QD  -c/w enalapril 5mg BID, increase prn, may need second agent if BP remains elevated, however will hold on adding a second agent o/n in setting of sepsis.     #CAD- home med rousuvastain 10mg QD, asa 81 QD  -c/w home meds
home med: enalapril 5mg QD  -c/w enalapril 5mg BID, increase prn, may need second agent if BP remains elevated, however will hold on adding a second agent o/n in setting of sepsis.     #CAD- home med rousuvastain 10mg QD, asa 81 QD  -c/w home meds

## 2021-03-22 NOTE — PROGRESS NOTE ADULT - PROBLEM SELECTOR PLAN 2
- s/p 1g ceftriaxone in ED  - c/w ceftriaxone ending today, Ucx NGTD  - BCx NGTD
- s/p 1g ceftriaxone in ED  - c/w ceftriaxone pending urine culture  - f/u BCx

## 2021-03-22 NOTE — DISCHARGE NOTE PROVIDER - NSDCFUSCHEDAPPT_GEN_ALL_CORE_FT
CONNIE VELIZ ; 04/05/2021 ; [facility] [address] CONNIE VELIZ ; 04/02/2021 ; [facility] [address]  CONNIE VELIZ ; 04/05/2021 ; [facility] [address]

## 2021-03-22 NOTE — PROGRESS NOTE ADULT - PROBLEM SELECTOR PLAN 10
home meds: latanoprost 1x QHS, combigan 2 drops daily, and azopt 1% 1 drop q12  - c/w home meds
home meds: latanoprost 1x QHS, combigan 2 drops daily, and azopt 1% 1 drop q12  - c/w home meds

## 2021-03-22 NOTE — PROGRESS NOTE ADULT - SUBJECTIVE AND OBJECTIVE BOX
OVERNIGHT EVENTS: KAIN    SUBJECTIVE / INTERVAL HPI: Patient seen and examined at bedside. This morning patient reporting back pain, that is non radiating, denies numbness, or tingling with the pain, otherwise denies fever, chills, lightheadedness, CP, palpitations, SOB, cough, N/V/D/C, abdominal pain, dysuria, hematuria, LE edema.      VITAL SIGNS:  Vital Signs Last 24 Hrs  T(C): 36.9 (22 Mar 2021 05:57), Max: 37.1 (21 Mar 2021 20:40)  T(F): 98.5 (22 Mar 2021 05:57), Max: 98.8 (21 Mar 2021 20:40)  HR: 106 (22 Mar 2021 05:57) (100 - 112)  BP: 165/88 (22 Mar 2021 05:57) (149/80 - 184/97)  BP(mean): --  RR: 18 (22 Mar 2021 05:57) (17 - 24)  SpO2: 97% (22 Mar 2021 05:57) (95% - 97%)    PHYSICAL EXAM:    General: WDWN  HEENT: NC/AT; PERRL, anicteric sclera; MMM, left forehead laceration  Neck: supple  Cardiovascular: +S1/S2; RRR  Respiratory: decreased breath sounds bilateral lower lobes   Gastrointestinal: distended, soft, NT; +BSx4  Extremities: WWP; no edema, clubbing or cyanosis  Vascular: 2+ radial, DP/PT pulses B/L  Neurological: AAOx3; no focal deficits    MEDICATIONS:  MEDICATIONS  (STANDING):  aspirin enteric coated 81 milliGRAM(s) Oral daily  atorvastatin 40 milliGRAM(s) Oral at bedtime  cefTRIAXone   IVPB 1000 milliGRAM(s) IV Intermittent every 24 hours  dolutegravir 50 milliGRAM(s) Oral at bedtime  enalapril 5 milliGRAM(s) Oral two times a day  enoxaparin Injectable 40 milliGRAM(s) SubCutaneous every 24 hours  rilpivirine 25 milliGRAM(s) Oral at bedtime    MEDICATIONS  (PRN):  acetaminophen   Tablet .. 650 milliGRAM(s) Oral every 6 hours PRN Mild Pain (1 - 3)  morphine  - Injectable 2 milliGRAM(s) IV Push every 4 hours PRN Severe Pain (7 - 10)  oxyCODONE    IR 5 milliGRAM(s) Oral every 4 hours PRN Moderate Pain (4 - 6)      ALLERGIES:  Allergies    Bactrim (Unknown)  Gluten (Diarrhea)  sulfates (Unknown)    Intolerances        LABS:                        14.1   11.05 )-----------( 173      ( 22 Mar 2021 07:36 )             44.1     03-22    135  |  99  |  10  ----------------------------<  104<H>  3.6   |  22  |  0.71    Ca    9.3      22 Mar 2021 07:36  Phos  2.1     -  Mg     1.8         TPro  7.4  /  Alb  4.3  /  TBili  1.2  /  DBili  x   /  AST  40  /  ALT  28  /  AlkPhos  125<H>  03-20    PT/INR - ( 22 Mar 2021 07:36 )   PT: 13.0 sec;   INR: 1.09          PTT - ( 22 Mar 2021 07:36 )  PTT:31.5 sec  Urinalysis Basic - ( 20 Mar 2021 17:20 )    Color: Yellow / Appearance: Clear / S.025 / pH: x  Gluc: x / Ketone: Trace mg/dL  / Bili: Small / Urobili: 2.0 E.U./dL   Blood: x / Protein: 100 mg/dL / Nitrite: POSITIVE   Leuk Esterase: NEGATIVE / RBC: < 5 /HPF / WBC < 5 /HPF   Sq Epi: x / Non Sq Epi: 5-10 /HPF / Bacteria: Present /HPF      CAPILLARY BLOOD GLUCOSE      POCT Blood Glucose.: 153 mg/dL (20 Mar 2021 13:20)      RADIOLOGY & ADDITIONAL TESTS: Reviewed.    ASSESSMENT:    PLAN:

## 2021-03-22 NOTE — PROGRESS NOTE ADULT - PROBLEM SELECTOR PLAN 3
Patient with tachypnea, tachycardia  Elevated D-dimer  CT PE ordered to r/o PE in setting of tachycardia and tachypnea, found to have b/l pleural effusion  -Pulm consult for eval for possible thoracentesis   Encourage OOB, IS
Patient with tachypnea, tachycardia  Elevated D-dimer  CT PE ordered to r/o PE in setting of tachycardia and tachypnea, found to have b/l pleural effusion  Pulm consult for eval for possible therapeutic tap  Encourage OOB, IS

## 2021-03-22 NOTE — PROGRESS NOTE ADULT - PROBLEM SELECTOR PLAN 4
-CT with concern for osteoblastic mets  -Was scheduled for PET scan outpatient on Thursday  -Needs pain control, PT eval   -Dr. MORA hammonds
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

## 2021-03-22 NOTE — DISCHARGE NOTE NURSING/CASE MANAGEMENT/SOCIAL WORK - PATIENT PORTAL LINK FT
You can access the FollowMyHealth Patient Portal offered by [facility] by registering at the following website: [email or web address]. By joining [facility]’s FollowMyHealth portal, you will also be able to view your health information using other applications (apps) compatible with our system.

## 2021-03-22 NOTE — PROGRESS NOTE ADULT - SUBJECTIVE AND OBJECTIVE BOX
PULMONARY CONSULT FOLLOW-UP NOTE    INTERVAL HISTORY:   Reviewed chart and overnight events; patient seen and examined at bedside.  denies sob , cough , chest pain   discussed the plans for thoracentesis , indication  and risks.     MEDICATIONS:  Pulmonary:    Antimicrobials:  cefTRIAXone   IVPB 1000 milliGRAM(s) IV Intermittent every 24 hours  dolutegravir 50 milliGRAM(s) Oral at bedtime  rilpivirine 25 milliGRAM(s) Oral at bedtime    Anticoagulants:  aspirin enteric coated 81 milliGRAM(s) Oral daily  enoxaparin Injectable 40 milliGRAM(s) SubCutaneous every 24 hours    Cardiac:  enalapril 5 milliGRAM(s) Oral two times a day      Allergies    Bactrim (Unknown)  Gluten (Diarrhea)  sulfates (Unknown)    Intolerances        Vital Signs Last 24 Hrs  T(C): 36.9 (22 Mar 2021 05:57), Max: 37.1 (21 Mar 2021 20:40)  T(F): 98.5 (22 Mar 2021 05:57), Max: 98.8 (21 Mar 2021 20:40)  HR: 106 (22 Mar 2021 05:57) (100 - 107)  BP: 165/88 (22 Mar 2021 05:57) (149/80 - 183/90)  BP(mean): --  RR: 18 (22 Mar 2021 05:57) (17 - 18)  SpO2: 97% (22 Mar 2021 05:57) (95% - 97%)        PHYSICAL EXAM:  Constitutional: WDWN  HEENT: NC/AT; PERRL, anicteric sclera; MMM  Neck: supple  Cardiovascular: +S1/S2, RRR  Respiratory: CTA B/L; no W/R/R  Gastrointestinal: soft, NT/ND; +BSx4  Extremities: WWP; no edema, clubbing or cyanosis  Vascular: 2+ radial, DP/PT pulses B/L  Neurological: AAOx3; no focal deficits    LABS:      CBC Full  -  ( 22 Mar 2021 07:36 )  WBC Count : 11.05 K/uL  RBC Count : 4.50 M/uL  Hemoglobin : 14.1 g/dL  Hematocrit : 44.1 %  Platelet Count - Automated : 173 K/uL  Mean Cell Volume : 98.0 fl  Mean Cell Hemoglobin : 31.3 pg  Mean Cell Hemoglobin Concentration : 32.0 gm/dL  Auto Neutrophil # : x  Auto Lymphocyte # : x  Auto Monocyte # : x  Auto Eosinophil # : x  Auto Basophil # : x  Auto Neutrophil % : x  Auto Lymphocyte % : x  Auto Monocyte % : x  Auto Eosinophil % : x  Auto Basophil % : x        135  |  99  |  10  ----------------------------<  104<H>  3.6   |  22  |  0.71    Ca    9.3      22 Mar 2021 07:36  Phos  2.1       Mg     1.8         TPro  7.4  /  Alb  4.3  /  TBili  1.2  /  DBili  x   /  AST  40  /  ALT  28  /  AlkPhos  125<H>      PT/INR - ( 22 Mar 2021 07:36 )   PT: 13.0 sec;   INR: 1.09          PTT - ( 22 Mar 2021 07:36 )  PTT:31.5 sec      Urinalysis Basic - ( 20 Mar 2021 17:20 )    Color: Yellow / Appearance: Clear / S.025 / pH: x  Gluc: x / Ketone: Trace mg/dL  / Bili: Small / Urobili: 2.0 E.U./dL   Blood: x / Protein: 100 mg/dL / Nitrite: POSITIVE   Leuk Esterase: NEGATIVE / RBC: < 5 /HPF / WBC < 5 /HPF   Sq Epi: x / Non Sq Epi: 5-10 /HPF / Bacteria: Present /HPF        < from: CT Angio Chest PE Protocol w/ IV Cont (21 @ 12:04) >  XAM:  CT ANGIO CHEST PE PROTOCOL IC                          PROCEDURE DATE:  2021          INTERPRETATION:  CLINICAL HISTORY: 70-year-old with tachypnea, tachycardia, probable prostate cancer and elevated d-dimer.          FINDINGS: CT angiography of the chest was performed with the administration of intravenous contrast. Multiplanar and maximum intensity projection 3D reconstructions were performed in the sagittal and coronal planes. Comparison is made to prior study dated 2/3/2021 andrecent chest radiograph dated 3/20/2021.    Evaluation of the chest demonstrates that the visualized thyroid gland is normal in appearance. There is normal heart size. Left ventricular hypertrophy. Moderate calcification of the aortic valve, which can correlate with degree of aortic stenosis. There is severe coronary arterial calcification. Moderate bilateral pleural effusions. No central or segmental pulmonary embolus with adequate opacification of the pulmonary artery and its distal branches. Evaluation of the lung parenchyma demonstrates bibasilar compressive atelectasis. Calcified granulomata within the right upper lobe. No pulmonary consolidation or mass. No endobronchial lesion. Evaluation of the upper abdomen demonstrates mild bilateral adrenal adenomatous lesion. Small left renal cysts. Moderate aortic vascular calcification. Evaluation of the osseous structures demonstrates multiple regions of sclerosis within the left-sided ribs ribs and thoracic vertebrae consistent with blastic metastasis.          IMPRESSION:    Negative for pulmonary embolism.    Moderate bilateral pleural effusions.    Osteoblastic metastasis.    < end of copied text >

## 2021-03-22 NOTE — PROGRESS NOTE ADULT - PROBLEM SELECTOR PLAN 1
POA- with tachycardia, leukocytosis, and source (positive UA)  -Cw CTX ending today   -Blood cx, Ucx NGTD
POA- with tachycardia, leukocytosis, and source (positive UA)  Cw CTX   F/U Blood cx, Ucx

## 2021-03-22 NOTE — CONSULT NOTE ADULT - ASSESSMENT
The  patient is a 69 y/o m hx HTN, HLD, HIV (viral load undetectable in dec 2020 with CD4 count then 361) on ART who came to hospital due to frequent falls and worsening back pain. We have been consulted due to b/l pleural effusion. 
per Internal Medicine      71 y/o male with PMH HTN, HLD, outpt w/u for prostate CA, well controlled HIV on ART presents with chronic falls no fractures on imaging, persistent pain but no intact neuro exam but consistent pain. Imaging incidentally noted spinal lesions c/f metastatic prostate ca and bilateral pleural effusions without any overt CHF symptoms and normal appearing EF per bedside TTE, c/f malignant effusions though rare for prostate ca. Initial concern for UTI but no WBC on urine and urine cx negative patient asymptomatic hold off further treatment s/p 3 doses abx here.   -Pending possible thora by pulm today, low c/f CHF per exam as appears dry and per bedside TTE (collapseable IVC, intact appearing systolic function)   -Has close heme/onc f/u as outpt (Dr. NOGUEIRA)- will inform hematologist about findings to to f/u cytology, outpt PET already scheduled   -Severe back pain s/p fall, now low c/f cord involvement per exam and imaging neg for fracture, will trial po opioid regimen to allow for pain management at home. Per PT ok for home with home PT  -Dispo- possibly today s/p thora if stable pain control on short course po opioids and close heme/onc and PCP f/u (reached out to both)

## 2021-03-23 ENCOUNTER — NON-APPOINTMENT (OUTPATIENT)
Age: 71
End: 2021-03-23

## 2021-03-25 LAB
CULTURE RESULTS: SIGNIFICANT CHANGE UP
CULTURE RESULTS: SIGNIFICANT CHANGE UP
SPECIMEN SOURCE: SIGNIFICANT CHANGE UP
SPECIMEN SOURCE: SIGNIFICANT CHANGE UP

## 2021-03-29 DIAGNOSIS — I25.10 ATHEROSCLEROTIC HEART DISEASE OF NATIVE CORONARY ARTERY WITHOUT ANGINA PECTORIS: ICD-10-CM

## 2021-03-29 DIAGNOSIS — A41.9 SEPSIS, UNSPECIFIED ORGANISM: ICD-10-CM

## 2021-03-29 DIAGNOSIS — Z91.81 HISTORY OF FALLING: ICD-10-CM

## 2021-03-29 DIAGNOSIS — R53.1 WEAKNESS: ICD-10-CM

## 2021-03-29 DIAGNOSIS — C79.51 SECONDARY MALIGNANT NEOPLASM OF BONE: ICD-10-CM

## 2021-03-29 DIAGNOSIS — Z21 ASYMPTOMATIC HUMAN IMMUNODEFICIENCY VIRUS [HIV] INFECTION STATUS: ICD-10-CM

## 2021-03-29 DIAGNOSIS — N39.0 URINARY TRACT INFECTION, SITE NOT SPECIFIED: ICD-10-CM

## 2021-03-29 DIAGNOSIS — H40.9 UNSPECIFIED GLAUCOMA: ICD-10-CM

## 2021-03-29 DIAGNOSIS — G47.00 INSOMNIA, UNSPECIFIED: ICD-10-CM

## 2021-03-29 DIAGNOSIS — C61 MALIGNANT NEOPLASM OF PROSTATE: ICD-10-CM

## 2021-03-29 DIAGNOSIS — Z88.2 ALLERGY STATUS TO SULFONAMIDES: ICD-10-CM

## 2021-03-29 DIAGNOSIS — G43.909 MIGRAINE, UNSPECIFIED, NOT INTRACTABLE, WITHOUT STATUS MIGRAINOSUS: ICD-10-CM

## 2021-03-29 DIAGNOSIS — J90 PLEURAL EFFUSION, NOT ELSEWHERE CLASSIFIED: ICD-10-CM

## 2021-03-29 DIAGNOSIS — E29.1 TESTICULAR HYPOFUNCTION: ICD-10-CM

## 2021-03-29 DIAGNOSIS — I10 ESSENTIAL (PRIMARY) HYPERTENSION: ICD-10-CM

## 2021-03-31 ENCOUNTER — TRANSCRIPTION ENCOUNTER (OUTPATIENT)
Age: 71
End: 2021-03-31

## 2021-04-01 ENCOUNTER — APPOINTMENT (OUTPATIENT)
Dept: PULMONOLOGY | Facility: CLINIC | Age: 71
End: 2021-04-01
Payer: MEDICARE

## 2021-04-01 VITALS
HEART RATE: 103 BPM | OXYGEN SATURATION: 97 % | WEIGHT: 170 LBS | DIASTOLIC BLOOD PRESSURE: 78 MMHG | SYSTOLIC BLOOD PRESSURE: 129 MMHG | HEIGHT: 70 IN | BODY MASS INDEX: 24.34 KG/M2 | TEMPERATURE: 97.2 F

## 2021-04-01 PROCEDURE — 99214 OFFICE O/P EST MOD 30 MIN: CPT

## 2021-04-01 PROCEDURE — 99072 ADDL SUPL MATRL&STAF TM PHE: CPT

## 2021-04-01 PROCEDURE — 76604 US EXAM CHEST: CPT

## 2021-04-01 NOTE — PROCEDURE
[FreeTextEntry1] : Bedside Limited Lung ultrasound:\par - Bilateral lung sliding present\par - Bilateral A lines, No B lines\par - No pleural effusion on left side\par - Mild pleural effusion on right side  \par \par Impression: Improvement in pleural effusion.\par

## 2021-04-01 NOTE — REASON FOR VISIT
[Follow-Up - From Hospitalization] : a follow-up visit after a recent hospitalization [TextBox_44] : Pleural effusion

## 2021-04-01 NOTE — PHYSICAL EXAM
[No Acute Distress] : no acute distress [Well Nourished] : well nourished [Normal Oropharynx] : normal oropharynx [II] : Mallampati Class: II [Normal Appearance] : normal appearance [No JVD] : no jvd [Normal Rate/Rhythm] : normal rate/rhythm [Normal S1, S2] : normal s1, s2 [No Resp Distress] : no resp distress [Clear to Auscultation Bilaterally] : clear to auscultation bilaterally [No Abnormalities] : no abnormalities [Benign] : benign [Not Tender] : not tender [Normal Gait] : normal gait [Gait - Sufficient For Exercise Testing] : gait sufficient for exercise testing [No Clubbing] : no clubbing [No Edema] : no edema [Normal Color/ Pigmentation] : normal color/ pigmentation [No Rash] : no rash [No Focal Deficits] : no focal deficits [No Sensory Deficits] : no sensory deficits [Oriented x3] : oriented x3 [Normal Affect] : normal affect

## 2021-04-01 NOTE — DISCUSSION/SUMMARY
[FreeTextEntry1] : 71 y/o male with PMH HTN, HLD, HIV on ART (last CD4 count in 12/2020 361 with undetectable viral load) was admitted after a fall. Patient was found to have osteoblastic lesions along with pleural effusions. \par \par Review:\par Hospital discharge records\par CXR and CT chest\par Labs\par \par A/P\par Lung ultrasound was done at bedside. Right side did not have any pleural effusion. Minimal pleural effusion was present on right side. Significant improvement since admission. Patient is asymptomatic from Pulmonary stand point. Images were shown to patient. Follow up Prn

## 2021-04-01 NOTE — HISTORY OF PRESENT ILLNESS
[TextBox_4] : 69 y/o male with PMH HTN, HLD, HIV on ART (last CD4 count in 12/2020 361 with undetectable viral load) was admitted after a fall. Patient was found to have osteoblastic lesions along with pleural effusions. Pleural effusion on bedside ultrasound improved in few days and hence, decision was taken not to do thoracentesis. Patient came today for follow up of pleural effusion. Denying pulmonary symptoms. \par

## 2021-04-01 NOTE — REVIEW OF SYSTEMS
[Fever] : no fever [Chills] : no chills [Dry Eyes] : no dry eyes [Eye Irritation] : no eye irritation [Cough] : no cough [Sputum] : no sputum [Dyspnea] : no dyspnea [SOB on Exertion] : no sob on exertion [Chest Discomfort] : no chest discomfort [Orthopnea] : no orthopnea [Hay Fever] : no hay fever [Nasal Discharge] : no nasal discharge [GERD] : no gerd [Diarrhea] : no diarrhea [TextBox_148] : rest of ROS negative except in HPI

## 2021-04-02 ENCOUNTER — APPOINTMENT (OUTPATIENT)
Dept: INTERNAL MEDICINE | Facility: CLINIC | Age: 71
End: 2021-04-02
Payer: MEDICARE

## 2021-04-02 VITALS
OXYGEN SATURATION: 100 % | HEIGHT: 70 IN | BODY MASS INDEX: 24.2 KG/M2 | DIASTOLIC BLOOD PRESSURE: 76 MMHG | WEIGHT: 169 LBS | SYSTOLIC BLOOD PRESSURE: 116 MMHG | TEMPERATURE: 98 F | HEART RATE: 95 BPM

## 2021-04-02 DIAGNOSIS — E29.1 TESTICULAR HYPOFUNCTION: ICD-10-CM

## 2021-04-02 PROCEDURE — 99072 ADDL SUPL MATRL&STAF TM PHE: CPT

## 2021-04-02 PROCEDURE — 99495 TRANSJ CARE MGMT MOD F2F 14D: CPT

## 2021-04-02 RX ORDER — BICALUTAMIDE 50 MG/1
50 TABLET ORAL
Qty: 30 | Refills: 0 | Status: ACTIVE | COMMUNITY
Start: 2021-03-26

## 2021-04-05 ENCOUNTER — APPOINTMENT (OUTPATIENT)
Dept: HEART AND VASCULAR | Facility: CLINIC | Age: 71
End: 2021-04-05

## 2021-04-05 PROBLEM — E29.1 HYPOGONADISM IN MALE: Status: ACTIVE | Noted: 2020-12-30

## 2021-04-05 RX ORDER — TESTOSTERONE 10 MG/G
50 GEL TRANSDERMAL
Refills: 0 | Status: DISCONTINUED | COMMUNITY
End: 2021-04-05

## 2021-04-05 NOTE — HISTORY OF PRESENT ILLNESS
[Post-hospitalization from ___ Hospital] : Post-hospitalization from [unfilled] Hospital [Admitted on: ___] : The patient was admitted on [unfilled] [Discharged on ___] : discharged on [unfilled] [Discharge Summary] : discharge summary [Pertinent Labs] : pertinent labs [Radiology Findings] : radiology findings [Discharge Med List] : discharge medication list [Patient Contacted By: ____] : and contacted by [unfilled] [FreeTextEntry2] : 70 year old male patient with presumed metastatic pancreatic cancer - new dx was admitted to [facility] for worsening pain inability to take care of himself. His hospital course was complicated by discovery of pleural effusions which was initially planned to be sampled but then decision reversed.\par Patient is currently under care of med onc and  for prostate cancer. still pending tissue dx as insurance auth is not complete. Patient under care of Dr Randolph and Dr Lopes from . Current plan is to take a tissue sample of thoracic osteoblastic lesion. \par Patient has hx of hypogonadism and was on androgel for many years. He is d/c'd of the [facility] currently.

## 2021-04-05 NOTE — REVIEW OF SYSTEMS
[Chills] : no chills [Fatigue] : fatigue [Hot Flashes] : hot flashes [Joint Stiffness] : joint stiffness [Back Pain] : back pain

## 2021-04-10 ENCOUNTER — LABORATORY RESULT (OUTPATIENT)
Age: 71
End: 2021-04-10

## 2021-04-13 ENCOUNTER — RESULT REVIEW (OUTPATIENT)
Age: 71
End: 2021-04-13

## 2021-04-13 ENCOUNTER — APPOINTMENT (OUTPATIENT)
Dept: CT IMAGING | Facility: HOSPITAL | Age: 71
End: 2021-04-13
Payer: MEDICARE

## 2021-04-13 ENCOUNTER — OUTPATIENT (OUTPATIENT)
Dept: OUTPATIENT SERVICES | Facility: HOSPITAL | Age: 71
LOS: 1 days | End: 2021-04-13
Payer: MEDICARE

## 2021-04-13 ENCOUNTER — APPOINTMENT (OUTPATIENT)
Dept: INTERVENTIONAL RADIOLOGY/VASCULAR | Facility: HOSPITAL | Age: 71
End: 2021-04-13
Payer: MEDICARE

## 2021-04-13 PROCEDURE — 88305 TISSUE EXAM BY PATHOLOGIST: CPT | Mod: 26,59

## 2021-04-13 PROCEDURE — 20220 BONE BIOPSY TROCAR/NDL SUPFC: CPT

## 2021-04-13 PROCEDURE — 88341 IMHCHEM/IMCYTCHM EA ADD ANTB: CPT

## 2021-04-13 PROCEDURE — 88341 IMHCHEM/IMCYTCHM EA ADD ANTB: CPT | Mod: 26

## 2021-04-13 PROCEDURE — 88342 IMHCHEM/IMCYTCHM 1ST ANTB: CPT | Mod: 26

## 2021-04-13 PROCEDURE — 88311 DECALCIFY TISSUE: CPT | Mod: 26,59

## 2021-04-13 PROCEDURE — 77012 CT SCAN FOR NEEDLE BIOPSY: CPT | Mod: 26

## 2021-04-13 PROCEDURE — 88173 CYTOPATH EVAL FNA REPORT: CPT

## 2021-04-13 PROCEDURE — 88173 CYTOPATH EVAL FNA REPORT: CPT | Mod: 26

## 2021-04-13 PROCEDURE — 77012 CT SCAN FOR NEEDLE BIOPSY: CPT

## 2021-04-13 PROCEDURE — 88305 TISSUE EXAM BY PATHOLOGIST: CPT

## 2021-04-13 PROCEDURE — 88305 TISSUE EXAM BY PATHOLOGIST: CPT | Mod: 26

## 2021-04-13 PROCEDURE — 88311 DECALCIFY TISSUE: CPT

## 2021-04-13 PROCEDURE — 88342 IMHCHEM/IMCYTCHM 1ST ANTB: CPT

## 2021-04-14 LAB — NON-GYNECOLOGICAL CYTOLOGY STUDY: SIGNIFICANT CHANGE UP

## 2021-04-15 LAB — SURGICAL PATHOLOGY STUDY: SIGNIFICANT CHANGE UP

## 2021-04-18 ENCOUNTER — TRANSCRIPTION ENCOUNTER (OUTPATIENT)
Age: 71
End: 2021-04-18

## 2021-04-18 ENCOUNTER — RX RENEWAL (OUTPATIENT)
Age: 71
End: 2021-04-18

## 2021-05-05 ENCOUNTER — RX RENEWAL (OUTPATIENT)
Age: 71
End: 2021-05-05

## 2021-05-05 ENCOUNTER — TRANSCRIPTION ENCOUNTER (OUTPATIENT)
Age: 71
End: 2021-05-05

## 2021-05-13 ENCOUNTER — NON-APPOINTMENT (OUTPATIENT)
Age: 71
End: 2021-05-13

## 2021-05-14 NOTE — PHYSICAL THERAPY INITIAL EVALUATION ADULT - GAIT DISTANCE, PT EVAL
"   Progress Note      Patient Name: Keerthi Pillai   Patient ID: [id number]   Sex: Female   YOB: 1976    Primary Care Provider: Jennifer Ocasio MD   Referring Provider: Jennifer Ocasio MD    Visit Date: March 9, 2021    Provider: Laurel Worthy PA-C   Location: [facility]   Location Address: [address] [place] [address], [address]  [place], [place]  [id number]   Location Phone: [phone]          Chief Complaint  · Acute  · \"recent onset of anxiety\"      History Of Present Illness  Keerthi Pillai is a 45 year old /White female who presents for evaluation and treatment of:      worsening anxiety.  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 She has been having problems sleeping, going to sleep and staying asleep.  She has been working from home and has a new boss but doesn't think that is affecting her life as much as it should.  Patient has some left over Ativan and has taken half of it which helped her sleep.  She has been on trintellix.  Depression was worse before, now anxiety focused. Patient has tried counseling in the past but was not able to continue to due to her work schedule. Patient denies SI/HI.       Past Medical History  Disease Name Date Onset Notes   Acne --  --    Anxiety --  --    Arthritis --  --    Breast cancer --  --    Cancer --  --    Depression --  --    Forgetfulness --  --    Hemochromatosis 09/03/2018 --    Hypertension, Benign Essential --  --    Lumbago/low back pain 04/21/2020 --    Migraines --  --    Moderate episode of recurrent major depressive disorder 12/10/2017 will adjust lexapro and wellbutrin and see how she does   Night sweats --  --    Postmenopausal 04/21/2020 --    Primary osteoarthritis of right knee 03/07/2018 --    Sinus Trouble; unspecified --  --    Spondylolisthesis , lumbar 04/21/2020 --          Past Surgical History  Procedure Name Date Notes   Breast " reconstruction with implants --  --    Cesarian Section 2007 --    Colonoscopy --  --    Fat grafting 19 to bilateral breasts   Joint Surgery --  --    Knee repair  --    Mastectomy, bilateral --  --    Port Placement --  --          Medication List  Name Date Started Instructions   CBD Oil  take 30 mg BID   celecoxib 200 mg oral capsule 2020 TAKE ONE CAPSULE BY MOUTH EVERY DAY   cyclobenzaprine 10 mg oral tablet 2020 take 1 tablet (10 mg) by oral route 2 times per day   estradiol-norethindrone acet 1-0.5 mg oral tablet 2020 TAKE ONE TABLET BY MOUTH ONCE DAILY   hydroxyzine HCl 25 mg oral tablet 2019 take one tab po QHS   lisinopril 20 mg oral tablet 2020 TAKE 1 TABLET BY MOUTH EVERY DAY   Trintellix 10 mg oral tablet 2020 take 1 tablet (10 mg) by oral route once daily at the same time each day         Allergy List  Allergen Name Date Reaction Notes   NO KNOWN DRUG ALLERGIES --  --  --        Allergies Reconciled  Family Medical History  Disease Name Relative/Age Notes   Stroke  --    Heart Disease  --    Family history of brain cancer  --    Family history of certain chronic disabling diseases; arthritis Father/  Mother/   Mother; Father   Family history of Arthritis Father/  Mother/   Mother; Father         Reproductive History  Menstrual   Age Menarche: 12   Pregnancy Summary   Total Pregnancies: 3 Full Term: 2 Premature: 0   Ab Induced: 0 Ab Spontaneous: 1 Ectopics: 0   Multiples: 0 Livin         Social History  Finding Status Start/Stop Quantity Notes   Alcohol Current some day --/-- --  rarely drinks, less than 1 drink per day, has been drinking for 11-20 years   Alcohol Use Never --/-- --  does not drink   lives with children --  --/-- --  --    lives with spouse --  --/-- --  --     --  --/-- --  --    . --  --/-- --  --    Moderate Amount of Exercise (1-3 times weekly) --  --/-- --  --    No known infection risk --  --/-- --  --   "  Recreational Drug Use Never --/-- --  no   Tobacco Never --/-- --  never smoker   Working --  --/-- --  --          Immunizations  NameDate Admin Mfg Trade Name Lot Number Route Inj VIS Given VIS Publication   Hepatitis A04/27/2018 SK[facility] HAVRIX-ADULT  IM LD 04/27/2018 07/20/2016   Comments: patient tolerated well, left office in stable condition.   Rckapfewb99/02/2019 PMC Fluzone Quadrivalent [id number] IM LD 12/02/2019    Comments: Tolerated well         Review of Systems  · Constitutional  o Denies  o : fever, fatigue, weight loss, weight gain  · Cardiovascular  o Denies  o : lower extremity edema, claudication, chest pressure, palpitations  · Respiratory  o Denies  o : shortness of breath, wheezing, cough, hemoptysis, dyspnea on exertion  · Gastrointestinal  o Denies  o : nausea, vomiting, diarrhea, constipation, abdominal pain      Vitals  Date Time BP Position Site L\R Cuff Size HR RR TEMP (F) WT  HT  BMI kg/m2 BSA m2 O2 Sat FR L/min FiO2        03/09/2021 09:36 /76 Sitting    67 - R  97.3 168lbs 2oz 5'  1\" 31.77 1.81 99 %  21%          Physical Examination  · Constitutional  o Appearance  o : no acute distress, well-nourished  · Head and Face  o Head  o :   § Inspection  § : atraumatic, normocephalic  · Eyes  o Eyes  o : extraocular movements intact, no scleral icterus, no conjunctival injection  · Ears, Nose, Mouth and Throat  o Ears  o :   § External Ears  § : normal  o Nose  o :   § Intranasal Exam  § : nares patent  o Oral Cavity  o :   § Oral Mucosa  § : moist mucous membranes  · Respiratory  o Respiratory Effort  o : breathing comfortably, symmetric chest rise  o Auscultation of Lungs  o : clear to asculatation bilaterally, no wheezes, rales, or rhonchii  · Cardiovascular  o Heart  o :   § Auscultation of Heart  § : regular rate and rhythm, no murmurs, rubs, or gallops  o Peripheral Vascular System  o :   § Extremities  § : no edema  · Skin and Subcutaneous Tissue  o General Inspection  o : no " lesions present, no areas of discoloration, skin turgor normal  · Neurologic  o Mental Status Examination  o :   § Orientation  § : grossly oriented to person, place and time  o Gait and Station  o :   § Gait Screening  § : normal gait  · Psychiatric  o General  o : normal mood and affect              Assessment  · Anxiety     300.02/F41.1  Will add Buspar 10mg BID or PRN to Trintellix. Encouraged patient to go to counseling. Will check basic labs including thyroid. F/u with Dr. Ocasio in 1 month. If any SI/HI, go to ER, [facility] inpatient services, contact suicide hotline etc.       Plan  · Orders  o CBC with Auto Diff [facility] (84391) - 300.02/F41.1 - 03/09/2021  o CMP [facility] ([id number]) - 300.02/F41.1 - 03/09/2021  o Thyroid Profile (THYII, 13014, 23507) - 300.02/F41.1 - 03/09/2021  o ACO-39: Current medications updated and reviewed (1159F, ) - - 03/09/2021  · Medications  o buspirone 10 mg oral tablet   SIG: take 1 tablet (10 mg) by oral route 2 times per day for 30 days   DISP: (60) Tablet with 1 refills  Prescribed on 03/09/2021     o Medications have been Reconciled  o Transition of Care or Provider Policy  · Instructions  o Take all medications as prescribed/directed.  o Patient was educated/instructed on their diagnosis, treatment and medications prior to discharge from the clinic today.  o Patient instructed to seek medical attention urgently for new or worsening symptoms.  o Call the office with any concerns or questions.  · Disposition  o follow up in 1 month  o Medications sent electronically to pharmacy  o Follow up with Dr. Ocasio            Electronically Signed by: SHILOH OneillC -Author on March 9, 2021 01:46:12 PM   25 feet

## 2021-05-26 ENCOUNTER — RX RENEWAL (OUTPATIENT)
Age: 71
End: 2021-05-26

## 2021-06-02 ENCOUNTER — APPOINTMENT (OUTPATIENT)
Dept: INTERNAL MEDICINE | Facility: CLINIC | Age: 71
End: 2021-06-02
Payer: MEDICARE

## 2021-06-02 VITALS
DIASTOLIC BLOOD PRESSURE: 79 MMHG | TEMPERATURE: 97 F | WEIGHT: 180 LBS | OXYGEN SATURATION: 100 % | HEIGHT: 70 IN | SYSTOLIC BLOOD PRESSURE: 149 MMHG | BODY MASS INDEX: 25.77 KG/M2 | HEART RATE: 92 BPM

## 2021-06-02 DIAGNOSIS — R26.81 UNSTEADINESS ON FEET: ICD-10-CM

## 2021-06-02 DIAGNOSIS — Z23 ENCOUNTER FOR IMMUNIZATION: ICD-10-CM

## 2021-06-02 PROCEDURE — 36415 COLL VENOUS BLD VENIPUNCTURE: CPT

## 2021-06-02 PROCEDURE — 99072 ADDL SUPL MATRL&STAF TM PHE: CPT

## 2021-06-02 PROCEDURE — 99214 OFFICE O/P EST MOD 30 MIN: CPT | Mod: 25

## 2021-06-02 RX ORDER — TESAMORELIN 2 MG
2 KIT SUBCUTANEOUS
Refills: 0 | Status: DISCONTINUED | COMMUNITY
End: 2021-06-02

## 2021-06-07 ENCOUNTER — TRANSCRIPTION ENCOUNTER (OUTPATIENT)
Age: 71
End: 2021-06-07

## 2021-06-07 LAB
ALBUMIN SERPL ELPH-MCNC: 4.4 G/DL
ALP BLD-CCNC: 179 U/L
ALT SERPL-CCNC: 26 U/L
ANION GAP SERPL CALC-SCNC: 10 MMOL/L
AST SERPL-CCNC: 27 U/L
BASOPHILS # BLD AUTO: 0.1 K/UL
BASOPHILS NFR BLD AUTO: 1.3 %
BILIRUB SERPL-MCNC: 0.5 MG/DL
BUN SERPL-MCNC: 11 MG/DL
CALCIUM SERPL-MCNC: 9.8 MG/DL
CD3 CELLS # BLD: 1151 /UL
CD3 CELLS NFR BLD: 75 %
CD3+CD4+ CELLS # BLD: 492 /UL
CD3+CD4+ CELLS NFR BLD: 32 %
CD3+CD4+ CELLS/CD3+CD8+ CLL SPEC: 0.82 RATIO
CD3+CD8+ CELLS # SPEC: 600 /UL
CD3+CD8+ CELLS NFR BLD: 39 %
CHLORIDE SERPL-SCNC: 103 MMOL/L
CO2 SERPL-SCNC: 26 MMOL/L
CREAT SERPL-MCNC: 0.99 MG/DL
EOSINOPHIL # BLD AUTO: 0.48 K/UL
EOSINOPHIL NFR BLD AUTO: 6.5 %
GLUCOSE SERPL-MCNC: 87 MG/DL
HCT VFR BLD CALC: 44.9 %
HGB BLD-MCNC: 14.5 G/DL
HIV1 RNA # SERPL NAA+PROBE: ABNORMAL
HIV1 RNA # SERPL NAA+PROBE: ABNORMAL COPIES/ML
IMM GRANULOCYTES NFR BLD AUTO: 0.3 %
LYMPHOCYTES # BLD AUTO: 1.77 K/UL
LYMPHOCYTES NFR BLD AUTO: 23.8 %
MAN DIFF?: NORMAL
MCHC RBC-ENTMCNC: 31.4 PG
MCHC RBC-ENTMCNC: 32.3 GM/DL
MCV RBC AUTO: 97.2 FL
MONOCYTES # BLD AUTO: 0.91 K/UL
MONOCYTES NFR BLD AUTO: 12.2 %
NEUTROPHILS # BLD AUTO: 4.16 K/UL
NEUTROPHILS NFR BLD AUTO: 55.9 %
PLATELET # BLD AUTO: 255 K/UL
POTASSIUM SERPL-SCNC: 4.7 MMOL/L
PROT SERPL-MCNC: 6.7 G/DL
RBC # BLD: 4.62 M/UL
RBC # FLD: 13 %
SODIUM SERPL-SCNC: 139 MMOL/L
VIRAL LOAD INTERP: NORMAL
VIRAL LOAD LOG: ABNORMAL LG COP/ML
WBC # FLD AUTO: 7.44 K/UL

## 2021-06-08 PROBLEM — R26.81 UNSTEADY GAIT WHEN WALKING: Status: ACTIVE | Noted: 2021-06-02

## 2021-06-08 NOTE — HISTORY OF PRESENT ILLNESS
[de-identified] : 70 year old male patient came in for follow up on AIDS disease, insomnia and f/u on prostate cancer dx.

## 2021-06-24 ENCOUNTER — TRANSCRIPTION ENCOUNTER (OUTPATIENT)
Age: 71
End: 2021-06-24

## 2021-06-30 ENCOUNTER — TRANSCRIPTION ENCOUNTER (OUTPATIENT)
Age: 71
End: 2021-06-30

## 2021-07-20 ENCOUNTER — TRANSCRIPTION ENCOUNTER (OUTPATIENT)
Age: 71
End: 2021-07-20

## 2021-07-22 ENCOUNTER — TRANSCRIPTION ENCOUNTER (OUTPATIENT)
Age: 71
End: 2021-07-22

## 2021-08-16 ENCOUNTER — TRANSCRIPTION ENCOUNTER (OUTPATIENT)
Age: 71
End: 2021-08-16

## 2021-09-29 ENCOUNTER — OUTPATIENT (OUTPATIENT)
Dept: OUTPATIENT SERVICES | Facility: HOSPITAL | Age: 71
LOS: 1 days | End: 2021-09-29
Payer: MEDICARE

## 2021-09-29 LAB — GLUCOSE BLDC GLUCOMTR-MCNC: 94 MG/DL — SIGNIFICANT CHANGE UP (ref 70–99)

## 2021-09-29 PROCEDURE — 78815 PET IMAGE W/CT SKULL-THIGH: CPT

## 2021-09-29 PROCEDURE — 78815 PET IMAGE W/CT SKULL-THIGH: CPT | Mod: 26

## 2021-09-29 PROCEDURE — 82962 GLUCOSE BLOOD TEST: CPT

## 2021-09-29 PROCEDURE — A9552: CPT

## 2021-10-01 ENCOUNTER — APPOINTMENT (OUTPATIENT)
Dept: INTERNAL MEDICINE | Facility: CLINIC | Age: 71
End: 2021-10-01
Payer: MEDICARE

## 2021-10-01 VITALS
OXYGEN SATURATION: 99 % | HEART RATE: 94 BPM | SYSTOLIC BLOOD PRESSURE: 107 MMHG | DIASTOLIC BLOOD PRESSURE: 69 MMHG | BODY MASS INDEX: 26.77 KG/M2 | TEMPERATURE: 97.7 F | HEIGHT: 70 IN | WEIGHT: 187 LBS

## 2021-10-01 DIAGNOSIS — M62.08 SEPARATION OF MUSCLE (NONTRAUMATIC), OTHER SITE: ICD-10-CM

## 2021-10-01 PROCEDURE — 36415 COLL VENOUS BLD VENIPUNCTURE: CPT

## 2021-10-01 PROCEDURE — 99214 OFFICE O/P EST MOD 30 MIN: CPT | Mod: 25

## 2021-10-05 ENCOUNTER — TRANSCRIPTION ENCOUNTER (OUTPATIENT)
Age: 71
End: 2021-10-05

## 2021-10-05 PROBLEM — M62.08 DIASTASIS RECTI: Status: ACTIVE | Noted: 2021-10-01

## 2021-10-05 LAB
ALBUMIN SERPL ELPH-MCNC: 5.2 G/DL
ALP BLD-CCNC: 77 U/L
ALT SERPL-CCNC: 23 U/L
ANION GAP SERPL CALC-SCNC: 15 MMOL/L
APPEARANCE: ABNORMAL
AST SERPL-CCNC: 22 U/L
BACTERIA: NEGATIVE
BASOPHILS # BLD AUTO: 0.09 K/UL
BASOPHILS NFR BLD AUTO: 1.1 %
BILIRUB SERPL-MCNC: 0.6 MG/DL
BILIRUBIN URINE: NEGATIVE
BLOOD URINE: NEGATIVE
BUN SERPL-MCNC: 18 MG/DL
CALCIUM SERPL-MCNC: 10.4 MG/DL
CHLORIDE SERPL-SCNC: 101 MMOL/L
CHOLEST SERPL-MCNC: 164 MG/DL
CO2 SERPL-SCNC: 22 MMOL/L
COLOR: ABNORMAL
CREAT SERPL-MCNC: 1.66 MG/DL
EOSINOPHIL # BLD AUTO: 0.26 K/UL
EOSINOPHIL NFR BLD AUTO: 3.3 %
GLUCOSE QUALITATIVE U: NORMAL
GLUCOSE SERPL-MCNC: 126 MG/DL
GRANULAR CASTS: 1 /LPF
HCT VFR BLD CALC: 43.9 %
HDLC SERPL-MCNC: 68 MG/DL
HGB BLD-MCNC: 14.4 G/DL
HIV1 RNA # SERPL NAA+PROBE: NORMAL
HIV1 RNA # SERPL NAA+PROBE: NORMAL COPIES/ML
HYALINE CASTS: 3 /LPF
IMM GRANULOCYTES NFR BLD AUTO: 0.4 %
KETONES URINE: NEGATIVE
LDLC SERPL CALC-MCNC: 68 MG/DL
LEUKOCYTE ESTERASE URINE: NEGATIVE
LYMPHOCYTES # BLD AUTO: 1.77 K/UL
LYMPHOCYTES NFR BLD AUTO: 22.1 %
MAN DIFF?: NORMAL
MCHC RBC-ENTMCNC: 31.2 PG
MCHC RBC-ENTMCNC: 32.8 GM/DL
MCV RBC AUTO: 95 FL
MICROSCOPIC-UA: NORMAL
MONOCYTES # BLD AUTO: 1.13 K/UL
MONOCYTES NFR BLD AUTO: 14.1 %
NEUTROPHILS # BLD AUTO: 4.72 K/UL
NEUTROPHILS NFR BLD AUTO: 59 %
NITRITE URINE: NEGATIVE
NONHDLC SERPL-MCNC: 96 MG/DL
PH URINE: 5.5
PLATELET # BLD AUTO: 295 K/UL
POTASSIUM SERPL-SCNC: 4.1 MMOL/L
PROT SERPL-MCNC: 7.9 G/DL
PROTEIN URINE: ABNORMAL
RBC # BLD: 4.62 M/UL
RBC # FLD: 13 %
RED BLOOD CELLS URINE: 1 /HPF
SODIUM SERPL-SCNC: 138 MMOL/L
SPECIFIC GRAVITY URINE: 1.02
SQUAMOUS EPITHELIAL CELLS: 14 /HPF
TRIGL SERPL-MCNC: 141 MG/DL
URIC ACID CRYSTALS: ABNORMAL
UROBILINOGEN URINE: NORMAL
VIRAL LOAD INTERP: NORMAL
VIRAL LOAD LOG: NORMAL LG COP/ML
WBC # FLD AUTO: 8 K/UL
WHITE BLOOD CELLS URINE: 3 /HPF

## 2021-10-05 NOTE — HISTORY OF PRESENT ILLNESS
[de-identified] : 71 year old male patient came in for follow up visit for his AIDS/HIV disease and other chronic issues. \par \par

## 2021-10-05 NOTE — PHYSICAL EXAM
[No Acute Distress] : no acute distress [Soft] : abdomen soft [Normal] : affect was normal and insight and judgment were intact [de-identified] : large abdominal hernia

## 2021-12-21 NOTE — ED PROVIDER NOTE - CROS ED CONS ALL NEG
- - - Complex Repair And O-T Advancement Flap Text: The defect edges were debeveled with a #15 scalpel blade.  The primary defect was closed partially with a complex linear closure.  Given the location of the remaining defect, shape of the defect and the proximity to free margins an O-T advancement flap was deemed most appropriate for complete closure of the defect.  Using a sterile surgical marker, an appropriate advancement flap was drawn incorporating the defect and placing the expected incisions within the relaxed skin tension lines where possible.    The area thus outlined was incised deep to adipose tissue with a #15 scalpel blade.  The skin margins were undermined to an appropriate distance in all directions utilizing iris scissors.

## 2022-01-07 ENCOUNTER — APPOINTMENT (OUTPATIENT)
Dept: INTERNAL MEDICINE | Facility: CLINIC | Age: 72
End: 2022-01-07

## 2022-02-03 ENCOUNTER — TRANSCRIPTION ENCOUNTER (OUTPATIENT)
Age: 72
End: 2022-02-03

## 2022-02-08 ENCOUNTER — APPOINTMENT (OUTPATIENT)
Dept: NEPHROLOGY | Facility: CLINIC | Age: 72
End: 2022-02-08
Payer: MEDICARE

## 2022-02-08 ENCOUNTER — NON-APPOINTMENT (OUTPATIENT)
Age: 72
End: 2022-02-08

## 2022-02-08 VITALS
WEIGHT: 195 LBS | HEART RATE: 88 BPM | DIASTOLIC BLOOD PRESSURE: 72 MMHG | BODY MASS INDEX: 27.92 KG/M2 | HEIGHT: 70 IN | SYSTOLIC BLOOD PRESSURE: 118 MMHG

## 2022-02-08 DIAGNOSIS — K46.9 UNSPECIFIED ABDOMINAL HERNIA W/OUT OBSTRUCTION OR GANGRENE: ICD-10-CM

## 2022-02-08 PROCEDURE — 99204 OFFICE O/P NEW MOD 45 MIN: CPT

## 2022-02-08 NOTE — CONSULT LETTER
[Consult Letter:] : I had the pleasure of evaluating your patient, [unfilled]. [Please see my note below.] : Please see my note below. [Consult Closing:] : Thank you very much for allowing me to participate in the care of this patient.  If you have any questions, please do not hesitate to contact me. [Sincerely,] : Sincerely, [FreeTextEntry2] : Dr Nicole dan [FreeTextEntry3] : Sincerely, \par \par Romulo Walker MD, FACP  [DrRaphael  ___] : Dr. BECKER

## 2022-02-08 NOTE — HISTORY OF PRESENT ILLNESS
[FreeTextEntry1] : 71-year-old man referred by Dr. Nicole Christie, who has been treating him for metastatic prostate cancer with multiple bone metastases.  He has received a variety of therapies including Casodex, Lupron, Xgeva, zoledronate Decadron.  He has gained at least 20 pounds, which I suspect is due to steroids.  He denies edema or shortness of breath.  He has a history of hypertension, but has been normotensive in recent months.  His renal function has worsened recently, with a creatinine rising from 1.33 in early December to 1.55 in late December, and 1.56 in January.  A 24-hour urine protein was 687 mg.  In October, his creatinine was 1.66 with a BP of 107/69.  A CT last year showed 2 left renal stones, and bilateral renal cysts, but no obstructive uropathy.  His calcium score was very high at 3009, with four-vessel involvement and a calcified aorta.  He does not have angina clinically.  He underwent ESWL 15 years ago and successfully fragmented stones at that time.  He is HIV positive and has received Crixivan, and had an episode of severe JONATHON while living in [place] in July 2018 shortly after starting Biktarvy.  His creatinine was at least 10 but he recovered nicely without dialysis.

## 2022-02-08 NOTE — PHYSICAL EXAM
[General Appearance - Alert] : alert [General Appearance - In No Acute Distress] : in no acute distress [Sclera] : the sclera and conjunctiva were normal [PERRL With Normal Accommodation] : pupils were equal in size, round, and reactive to light [Outer Ear] : the ears and nose were normal in appearance [Neck Appearance] : the appearance of the neck was normal [Neck Cervical Mass (___cm)] : no neck mass was observed [Jugular Venous Distention Increased] : there was no jugular-venous distention [Thyroid Nodule] : there were no palpable thyroid nodules [Auscultation Breath Sounds / Voice Sounds] : lungs were clear to auscultation bilaterally [Heart Rate And Rhythm] : heart rate was normal and rhythm regular [Heart Sounds] : normal S1 and S2 [Heart Sounds Gallop] : no gallops [Murmurs] : no murmurs [Heart Sounds Pericardial Friction Rub] : no pericardial rub [Edema] : there was no peripheral edema [Skin Color & Pigmentation] : normal skin color and pigmentation [Skin Turgor] : normal skin turgor [] : no rash [Deep Tendon Reflexes (DTR)] : deep tendon reflexes were 2+ and symmetric [Sensation] : the sensory exam was normal to light touch and pinprick [No Focal Deficits] : no focal deficits [Oriented To Time, Place, And Person] : oriented to person, place, and time [Impaired Insight] : insight and judgment were intact [Affect] : the affect was normal

## 2022-02-08 NOTE — ASSESSMENT
[FreeTextEntry1] : 71-year-old man with a history of HIV/AIDS, metastatic prostate cancer to bone, four-vessel CAD without symptoms, and CKD 3 on a background of severe JONATHON 3 years ago and recurrent nephrolithiasis over a 15-year span.  He currently has 2 nonobstructing left kidney stones.  His creatinine is currently stable in the 1.5-1.6 range with mild to moderate proteinuria.  Etiology is probably multifactorial including prior damage from JONATHON/biktarvy, stones and ESW L, hypertension, and possibly  anticancer agents.  His GFR is currently around 45 which may influence dosing of anticancer agents.  I have ordered labs to be done in 3 weeks when he next sees Dr. Christie, including BMP, PTH, SAKINA 2, urine microalbumin, phosphorus.  He will return in 3 months, unless his interim labs require an earlier appointment.

## 2022-02-14 ENCOUNTER — APPOINTMENT (OUTPATIENT)
Dept: INTERNAL MEDICINE | Facility: CLINIC | Age: 72
End: 2022-02-14

## 2022-02-18 ENCOUNTER — APPOINTMENT (OUTPATIENT)
Dept: INTERNAL MEDICINE | Facility: CLINIC | Age: 72
End: 2022-02-18

## 2022-03-07 ENCOUNTER — RX RENEWAL (OUTPATIENT)
Age: 72
End: 2022-03-07

## 2022-03-25 ENCOUNTER — RX RENEWAL (OUTPATIENT)
Age: 72
End: 2022-03-25

## 2022-05-16 ENCOUNTER — TRANSCRIPTION ENCOUNTER (OUTPATIENT)
Age: 72
End: 2022-05-16

## 2022-05-16 DIAGNOSIS — H26.9 UNSPECIFIED CATARACT: ICD-10-CM

## 2022-05-17 ENCOUNTER — RX RENEWAL (OUTPATIENT)
Age: 72
End: 2022-05-17

## 2022-05-18 ENCOUNTER — APPOINTMENT (OUTPATIENT)
Dept: NEPHROLOGY | Facility: CLINIC | Age: 72
End: 2022-05-18
Payer: MEDICARE

## 2022-05-18 VITALS
WEIGHT: 205 LBS | BODY MASS INDEX: 29.35 KG/M2 | DIASTOLIC BLOOD PRESSURE: 73 MMHG | HEART RATE: 80 BPM | HEIGHT: 70 IN | SYSTOLIC BLOOD PRESSURE: 126 MMHG

## 2022-05-18 DIAGNOSIS — G43.909 MIGRAINE, UNSPECIFIED, NOT INTRACTABLE, W/OUT STATUS MIGRAINOSUS: ICD-10-CM

## 2022-05-18 DIAGNOSIS — I25.10 ATHEROSCLEROTIC HEART DISEASE OF NATIVE CORONARY ARTERY W/OUT ANGINA PECTORIS: ICD-10-CM

## 2022-05-18 DIAGNOSIS — Z78.9 OTHER SPECIFIED HEALTH STATUS: ICD-10-CM

## 2022-05-18 DIAGNOSIS — Z80.0 FAMILY HISTORY OF MALIGNANT NEOPLASM OF DIGESTIVE ORGANS: ICD-10-CM

## 2022-05-18 DIAGNOSIS — Z80.9 FAMILY HISTORY OF MALIGNANT NEOPLASM, UNSPECIFIED: ICD-10-CM

## 2022-05-18 DIAGNOSIS — N18.30 CHRONIC KIDNEY DISEASE, STAGE 3 UNSPECIFIED: ICD-10-CM

## 2022-05-18 DIAGNOSIS — B20 HUMAN IMMUNODEFICIENCY VIRUS [HIV] DISEASE: ICD-10-CM

## 2022-05-18 PROCEDURE — 99214 OFFICE O/P EST MOD 30 MIN: CPT

## 2022-05-18 NOTE — CONSULT LETTER
[Dear  ___] : Dear  [unfilled], [Consult Letter:] : I had the pleasure of evaluating your patient, [unfilled]. [Please see my note below.] : Please see my note below. [Consult Closing:] : Thank you very much for allowing me to participate in the care of this patient.  If you have any questions, please do not hesitate to contact me. [Sincerely,] : Sincerely, [FreeTextEntry2] : Dr Luz dan [FreeTextEntry3] : Sincerely, \par \par Romulo Walker MD, FACP\par

## 2022-05-18 NOTE — ASSESSMENT
[FreeTextEntry1] : 71-year-old man with well-controlled hypertension, stable/improved CKD with mild to moderate proteinuria.  He had a calcium score in excess of 3000, but has no angina or PAD symptoms.  I reminded him to avoid all NSAIDs and that Tylenol is okay.  I also reminded him to maintain his water intake to prevent stones or dehydration.  He will return in 4 to 5 months as long as he remains stable.

## 2022-05-18 NOTE — HISTORY OF PRESENT ILLNESS
[FreeTextEntry1] : 71-year-old man with metastatic prostate cancer to bone who has been on a variety of therapies.  He cannot a large amount of weight and is up to 205 pounds now, probably due largely to Decadron.  He has a history of hypertension on enalapril 5 mg twice daily but he has been normotensive in the last year.  His renal function worsened with a creatinine that was as high as 1.56 in December and January and 24-hour urine protein of 687.  His creatinine was 1.66 in October.  His BP was 101/70 in February at Dr. Luz Christie's office.  2 weeks ago, his creatinine was 1.19 with a BUN of 15, K4.3, GFR 60, hemoglobin 13.5, so he has improved substantially for reasons that are not clear.  He does have a remote history of kidney stones, which were fragmented by ESWL 15 years ago and have not recurred.  He did have an episode of severe JONATHON in the summer 2018 in [place] after starting Biktarvy.  His creatinine was 10 but he recovered nicely without the need for dialysis.

## 2022-06-03 ENCOUNTER — TRANSCRIPTION ENCOUNTER (OUTPATIENT)
Age: 72
End: 2022-06-03

## 2022-06-07 ENCOUNTER — TRANSCRIPTION ENCOUNTER (OUTPATIENT)
Age: 72
End: 2022-06-07

## 2022-06-07 ENCOUNTER — RX RENEWAL (OUTPATIENT)
Age: 72
End: 2022-06-07

## 2022-06-09 ENCOUNTER — TRANSCRIPTION ENCOUNTER (OUTPATIENT)
Age: 72
End: 2022-06-09

## 2022-06-10 ENCOUNTER — APPOINTMENT (OUTPATIENT)
Dept: INTERNAL MEDICINE | Facility: CLINIC | Age: 72
End: 2022-06-10
Payer: MEDICARE

## 2022-06-10 DIAGNOSIS — R45.89 OTHER SYMPTOMS AND SIGNS INVOLVING EMOTIONAL STATE: ICD-10-CM

## 2022-06-10 DIAGNOSIS — H40.9 UNSPECIFIED GLAUCOMA: ICD-10-CM

## 2022-06-10 PROCEDURE — 99215 OFFICE O/P EST HI 40 MIN: CPT | Mod: 95

## 2022-06-10 RX ORDER — LATANOPROST 50 UG/ML
0.01 SOLUTION OPHTHALMIC DAILY
Qty: 12 | Refills: 11 | Status: DISCONTINUED | COMMUNITY
End: 2022-06-10

## 2022-06-17 PROBLEM — H40.9 GLAUCOMA: Status: ACTIVE | Noted: 2021-01-29

## 2022-06-17 PROBLEM — R45.89 DEPRESSED MOOD: Status: ACTIVE | Noted: 2022-06-17

## 2022-06-17 NOTE — HISTORY OF PRESENT ILLNESS
[Home] : at home, [unfilled] , at the time of the visit. [Medical Office: ([facility])___] : at the medical office located in  [Verbal consent obtained from patient] : the patient, [unfilled] [de-identified] : 71 year old male patient on televideo for follow up on AIDS/HIV, hyperlipidemia and med refill for glaucoma. \par \par \par

## 2022-06-25 ENCOUNTER — APPOINTMENT (OUTPATIENT)
Dept: RADIOLOGY | Facility: CLINIC | Age: 72
End: 2022-06-25
Payer: MEDICARE

## 2022-06-25 ENCOUNTER — OUTPATIENT (OUTPATIENT)
Dept: OUTPATIENT SERVICES | Facility: HOSPITAL | Age: 72
LOS: 1 days | End: 2022-06-25

## 2022-06-25 ENCOUNTER — RESULT REVIEW (OUTPATIENT)
Age: 72
End: 2022-06-25

## 2022-06-25 PROCEDURE — 71046 X-RAY EXAM CHEST 2 VIEWS: CPT | Mod: 26

## 2022-07-16 ENCOUNTER — APPOINTMENT (OUTPATIENT)
Dept: AFTER HOURS CARE | Facility: EMERGENCY ROOM | Age: 72
End: 2022-07-16

## 2022-07-16 ENCOUNTER — TRANSCRIPTION ENCOUNTER (OUTPATIENT)
Age: 72
End: 2022-07-16

## 2022-07-16 DIAGNOSIS — U07.1 COVID-19: ICD-10-CM

## 2022-07-16 PROCEDURE — 99204 OFFICE O/P NEW MOD 45 MIN: CPT | Mod: 95

## 2022-07-17 ENCOUNTER — TRANSCRIPTION ENCOUNTER (OUTPATIENT)
Age: 72
End: 2022-07-17

## 2022-07-18 ENCOUNTER — TRANSCRIPTION ENCOUNTER (OUTPATIENT)
Age: 72
End: 2022-07-18

## 2022-08-23 ENCOUNTER — RX RENEWAL (OUTPATIENT)
Age: 72
End: 2022-08-23

## 2022-08-29 ENCOUNTER — APPOINTMENT (OUTPATIENT)
Dept: INTERNAL MEDICINE | Facility: CLINIC | Age: 72
End: 2022-08-29

## 2022-08-29 ENCOUNTER — NON-APPOINTMENT (OUTPATIENT)
Age: 72
End: 2022-08-29

## 2022-08-29 VITALS
OXYGEN SATURATION: 97 % | TEMPERATURE: 97.2 F | SYSTOLIC BLOOD PRESSURE: 132 MMHG | RESPIRATION RATE: 14 BRPM | DIASTOLIC BLOOD PRESSURE: 72 MMHG | HEIGHT: 70 IN | BODY MASS INDEX: 29.49 KG/M2 | HEART RATE: 90 BPM | WEIGHT: 206 LBS

## 2022-08-29 DIAGNOSIS — E78.5 HYPERLIPIDEMIA, UNSPECIFIED: ICD-10-CM

## 2022-08-29 DIAGNOSIS — Z86.39 PERSONAL HISTORY OF OTHER ENDOCRINE, NUTRITIONAL AND METABOLIC DISEASE: ICD-10-CM

## 2022-08-29 DIAGNOSIS — J90 PLEURAL EFFUSION, NOT ELSEWHERE CLASSIFIED: ICD-10-CM

## 2022-08-29 DIAGNOSIS — R53.82 CHRONIC FATIGUE, UNSPECIFIED: ICD-10-CM

## 2022-08-29 DIAGNOSIS — Z00.00 ENCOUNTER FOR GENERAL ADULT MEDICAL EXAMINATION W/OUT ABNORMAL FINDINGS: ICD-10-CM

## 2022-08-29 PROCEDURE — 36415 COLL VENOUS BLD VENIPUNCTURE: CPT

## 2022-08-29 PROCEDURE — 99214 OFFICE O/P EST MOD 30 MIN: CPT | Mod: 25

## 2022-08-29 PROCEDURE — 99397 PER PM REEVAL EST PAT 65+ YR: CPT | Mod: 25

## 2022-08-29 RX ORDER — OXYCODONE AND ACETAMINOPHEN 5; 325 MG/1; MG/1
5-325 TABLET ORAL
Qty: 90 | Refills: 0 | Status: DISCONTINUED | COMMUNITY
Start: 2021-03-26 | End: 2022-08-29

## 2022-08-29 RX ORDER — ENALAPRIL MALEATE 5 MG/1
5 TABLET ORAL TWICE DAILY
Qty: 60 | Refills: 6 | Status: DISCONTINUED | COMMUNITY
Start: 2021-09-21 | End: 2022-08-29

## 2022-08-29 RX ORDER — OXYCODONE 5 MG/1
5 TABLET ORAL
Qty: 21 | Refills: 0 | Status: DISCONTINUED | COMMUNITY
Start: 2021-03-22 | End: 2022-08-29

## 2022-09-01 LAB
25(OH)D3 SERPL-MCNC: 74.2 NG/ML
ALBUMIN SERPL ELPH-MCNC: 5 G/DL
ALP BLD-CCNC: 70 U/L
ALT SERPL-CCNC: 34 U/L
ANION GAP SERPL CALC-SCNC: 13 MMOL/L
AST SERPL-CCNC: 29 U/L
BASOPHILS # BLD AUTO: 0.11 K/UL
BASOPHILS NFR BLD AUTO: 1.2 %
BILIRUB SERPL-MCNC: 0.5 MG/DL
BUN SERPL-MCNC: 12 MG/DL
CALCIUM SERPL-MCNC: 10.6 MG/DL
CD3 CELLS # BLD: 1287 /UL
CD3 CELLS NFR BLD: 72 %
CD3+CD4+ CELLS # BLD: 557 /UL
CD3+CD4+ CELLS NFR BLD: 31 %
CD3+CD4+ CELLS/CD3+CD8+ CLL SPEC: 0.78 RATIO
CD3+CD8+ CELLS # SPEC: 711 /UL
CD3+CD8+ CELLS NFR BLD: 40 %
CHLORIDE SERPL-SCNC: 100 MMOL/L
CHOLEST SERPL-MCNC: 195 MG/DL
CO2 SERPL-SCNC: 27 MMOL/L
CREAT SERPL-MCNC: 1.07 MG/DL
EGFR: 74 ML/MIN/1.73M2
EOSINOPHIL # BLD AUTO: 0.27 K/UL
EOSINOPHIL NFR BLD AUTO: 2.9 %
ESTIMATED AVERAGE GLUCOSE: 120 MG/DL
FOLATE SERPL-MCNC: 8.7 NG/ML
GLUCOSE SERPL-MCNC: 102 MG/DL
HBA1C MFR BLD HPLC: 5.8 %
HCT VFR BLD CALC: 46.2 %
HCV AB SER QL: NONREACTIVE
HCV S/CO RATIO: 0.13 S/CO
HDLC SERPL-MCNC: 67 MG/DL
HGB BLD-MCNC: 14.6 G/DL
HIV1 RNA # SERPL NAA+PROBE: NORMAL
HIV1 RNA # SERPL NAA+PROBE: NORMAL COPIES/ML
IMM GRANULOCYTES NFR BLD AUTO: 0.4 %
LDLC SERPL CALC-MCNC: 92 MG/DL
LYMPHOCYTES # BLD AUTO: 1.97 K/UL
LYMPHOCYTES NFR BLD AUTO: 21.2 %
MAN DIFF?: NORMAL
MCHC RBC-ENTMCNC: 30 PG
MCHC RBC-ENTMCNC: 31.6 GM/DL
MCV RBC AUTO: 94.9 FL
MONOCYTES # BLD AUTO: 1.03 K/UL
MONOCYTES NFR BLD AUTO: 11.1 %
NEUTROPHILS # BLD AUTO: 5.89 K/UL
NEUTROPHILS NFR BLD AUTO: 63.2 %
NONHDLC SERPL-MCNC: 128 MG/DL
PLATELET # BLD AUTO: 309 K/UL
POTASSIUM SERPL-SCNC: 4.7 MMOL/L
PROT SERPL-MCNC: 7.4 G/DL
RBC # BLD: 4.87 M/UL
RBC # FLD: 12.9 %
SODIUM SERPL-SCNC: 140 MMOL/L
T PALLIDUM AB SER QL IA: NEGATIVE
T3FREE SERPL-MCNC: 2.9 PG/ML
T4 FREE SERPL-MCNC: 1.3 NG/DL
TRIGL SERPL-MCNC: 181 MG/DL
TSH SERPL-ACNC: 2.73 UIU/ML
VIRAL LOAD INTERP: NORMAL
VIRAL LOAD LOG: NORMAL LG COP/ML
VIT B12 SERPL-MCNC: 718 PG/ML
WBC # FLD AUTO: 9.31 K/UL

## 2022-09-02 PROBLEM — J90 BILATERAL PLEURAL EFFUSION: Status: ACTIVE | Noted: 2021-04-01

## 2022-09-02 NOTE — HISTORY OF PRESENT ILLNESS
[de-identified] : 72-year-old male patient came in for preventative visit and follow-up on his HIV disease, hypertension and other chronic conditions.\par \par Patient is diagnosed with metastatic prostate cancer and is currently under therapy.  PSA is at undetectable levels.\par \par Patient does not exercise,  to a male partner not sexually active, overall reports a healthy diet, complains about fatigue.

## 2022-09-02 NOTE — HEALTH RISK ASSESSMENT
[1] : 2) Feeling down, depressed, or hopeless for several days (1) [PHQ-2 Positive] : PHQ-2 Positive [[id number]] : 2

## 2022-09-21 ENCOUNTER — RX RENEWAL (OUTPATIENT)
Age: 72
End: 2022-09-21

## 2022-09-21 PROBLEM — U07.1 COVID: Status: ACTIVE | Noted: 2022-09-21

## 2022-09-21 NOTE — PHYSICAL EXAM
[No Acute Distress] : no acute distress [Well Nourished] : well nourished [Well Developed] : well developed [Normal Sclera/Conjunctiva] : normal sclera/conjunctiva [Normal Outer Ear/Nose] : the outer ears and nose were normal in appearance [No JVD] : no jugular venous distention [No Respiratory Distress] : no respiratory distress  [No Accessory Muscle Use] : no accessory muscle use [No Joint Swelling] : no joint swelling [Grossly Normal Strength/Tone] : grossly normal strength/tone [Coordination Grossly Intact] : coordination grossly intact [Normal Affect] : the affect was normal [Normal Insight/Judgement] : insight and judgment were intact

## 2022-09-21 NOTE — HISTORY OF PRESENT ILLNESS
[Home] : at home, [unfilled] , at the time of the visit. [Other Location: e.g. Home (Enter Location, City,State)___] : at [unfilled] [Verbal consent obtained from patient] : the patient, [unfilled] [FreeTextEntry8] : 73 yo male with ho hiv and prostate ca undergoing treatment day 4 of covid. Pt with no fever and + cough, no cp or sob

## 2022-10-03 ENCOUNTER — RX RENEWAL (OUTPATIENT)
Age: 72
End: 2022-10-03

## 2022-11-02 ENCOUNTER — RX RENEWAL (OUTPATIENT)
Age: 72
End: 2022-11-02

## 2022-12-06 ENCOUNTER — TRANSCRIPTION ENCOUNTER (OUTPATIENT)
Age: 72
End: 2022-12-06

## 2022-12-08 ENCOUNTER — RX RENEWAL (OUTPATIENT)
Age: 72
End: 2022-12-08

## 2022-12-21 ENCOUNTER — TRANSCRIPTION ENCOUNTER (OUTPATIENT)
Age: 72
End: 2022-12-21

## 2022-12-22 ENCOUNTER — TRANSCRIPTION ENCOUNTER (OUTPATIENT)
Age: 72
End: 2022-12-22

## 2023-01-06 ENCOUNTER — RX RENEWAL (OUTPATIENT)
Age: 73
End: 2023-01-06

## 2023-02-03 ENCOUNTER — RX RENEWAL (OUTPATIENT)
Age: 73
End: 2023-02-03

## 2023-02-15 ENCOUNTER — RX RENEWAL (OUTPATIENT)
Age: 73
End: 2023-02-15

## 2023-04-27 ENCOUNTER — NON-APPOINTMENT (OUTPATIENT)
Age: 73
End: 2023-04-27

## 2023-04-27 ENCOUNTER — APPOINTMENT (OUTPATIENT)
Dept: OPHTHALMOLOGY | Facility: CLINIC | Age: 73
End: 2023-04-27
Payer: MEDICARE

## 2023-04-27 PROCEDURE — 92004 COMPRE OPH EXAM NEW PT 1/>: CPT

## 2023-04-27 PROCEDURE — 92134 CPTRZ OPH DX IMG PST SGM RTA: CPT

## 2023-04-27 PROCEDURE — 92136 OPHTHALMIC BIOMETRY: CPT

## 2023-05-08 ENCOUNTER — RX RENEWAL (OUTPATIENT)
Age: 73
End: 2023-05-08

## 2023-05-08 ENCOUNTER — TRANSCRIPTION ENCOUNTER (OUTPATIENT)
Age: 73
End: 2023-05-08

## 2023-05-11 ENCOUNTER — TRANSCRIPTION ENCOUNTER (OUTPATIENT)
Age: 73
End: 2023-05-11

## 2023-05-12 ENCOUNTER — TRANSCRIPTION ENCOUNTER (OUTPATIENT)
Age: 73
End: 2023-05-12

## 2023-05-31 NOTE — ED PROVIDER NOTE - IV ALTEPASE ADMIN HIDDEN
[EKG obtained to assist in diagnosis and management of assessed problem(s)] : EKG obtained to assist in diagnosis and management of assessed problem(s)
show

## 2023-06-02 ENCOUNTER — RX RENEWAL (OUTPATIENT)
Age: 73
End: 2023-06-02

## 2023-06-19 ENCOUNTER — RX RENEWAL (OUTPATIENT)
Age: 73
End: 2023-06-19

## 2023-06-19 RX ORDER — BRINZOLAMIDE 10 MG/ML
1 SUSPENSION/ DROPS OPHTHALMIC
Qty: 10 | Refills: 0 | Status: ACTIVE | COMMUNITY
Start: 2022-08-23 | End: 1900-01-01

## 2023-06-26 ENCOUNTER — RX RENEWAL (OUTPATIENT)
Age: 73
End: 2023-06-26

## 2023-06-30 ENCOUNTER — APPOINTMENT (OUTPATIENT)
Dept: INTERNAL MEDICINE | Facility: CLINIC | Age: 73
End: 2023-06-30

## 2023-07-18 ENCOUNTER — RX RENEWAL (OUTPATIENT)
Age: 73
End: 2023-07-18

## 2023-07-25 ENCOUNTER — RX RENEWAL (OUTPATIENT)
Age: 73
End: 2023-07-25

## 2023-08-01 ENCOUNTER — NON-APPOINTMENT (OUTPATIENT)
Age: 73
End: 2023-08-01

## 2023-08-01 ENCOUNTER — APPOINTMENT (OUTPATIENT)
Dept: OPHTHALMOLOGY | Facility: CLINIC | Age: 73
End: 2023-08-01
Payer: MEDICARE

## 2023-08-01 PROCEDURE — 92014 COMPRE OPH EXAM EST PT 1/>: CPT

## 2023-08-07 ENCOUNTER — RX RENEWAL (OUTPATIENT)
Age: 73
End: 2023-08-07

## 2023-08-15 ENCOUNTER — RX RENEWAL (OUTPATIENT)
Age: 73
End: 2023-08-15

## 2023-08-31 ENCOUNTER — TRANSCRIPTION ENCOUNTER (OUTPATIENT)
Age: 73
End: 2023-08-31

## 2023-09-06 ENCOUNTER — APPOINTMENT (OUTPATIENT)
Dept: INTERNAL MEDICINE | Facility: CLINIC | Age: 73
End: 2023-09-06
Payer: MEDICARE

## 2023-09-06 VITALS
WEIGHT: 203 LBS | HEIGHT: 70 IN | RESPIRATION RATE: 18 BRPM | DIASTOLIC BLOOD PRESSURE: 68 MMHG | OXYGEN SATURATION: 98 % | BODY MASS INDEX: 29.06 KG/M2 | TEMPERATURE: 97.8 F | SYSTOLIC BLOOD PRESSURE: 134 MMHG | HEART RATE: 97 BPM

## 2023-09-06 DIAGNOSIS — R97.20 ELEVATED PROSTATE, SPECIFIC ANTIGEN [PSA]: ICD-10-CM

## 2023-09-06 DIAGNOSIS — F32.89 OTHER SPECIFIED DEPRESSIVE EPISODES: ICD-10-CM

## 2023-09-06 PROCEDURE — 99215 OFFICE O/P EST HI 40 MIN: CPT | Mod: 25

## 2023-09-06 PROCEDURE — 36415 COLL VENOUS BLD VENIPUNCTURE: CPT

## 2023-09-06 RX ORDER — DOCUSATE SODIUM 100 MG/1
100 CAPSULE ORAL 3 TIMES DAILY
Qty: 30 | Refills: 0 | Status: ACTIVE | COMMUNITY
Start: 2023-09-06 | End: 1900-01-01

## 2023-09-07 ENCOUNTER — RX RENEWAL (OUTPATIENT)
Age: 73
End: 2023-09-07

## 2023-09-11 ENCOUNTER — TRANSCRIPTION ENCOUNTER (OUTPATIENT)
Age: 73
End: 2023-09-11

## 2023-09-11 PROBLEM — R97.20 ELEVATED PSA: Status: ACTIVE | Noted: 2021-03-15

## 2023-09-11 PROBLEM — F32.89 OTHER DEPRESSION: Status: ACTIVE | Noted: 2023-09-11

## 2023-09-11 LAB
ALBUMIN SERPL ELPH-MCNC: 4.6 G/DL
ALP BLD-CCNC: 76 U/L
ALT SERPL-CCNC: 27 U/L
ANION GAP SERPL CALC-SCNC: 15 MMOL/L
APPEARANCE: CLEAR
AST SERPL-CCNC: 29 U/L
BACTERIA: NEGATIVE /HPF
BASOPHILS # BLD AUTO: 0.1 K/UL
BASOPHILS NFR BLD AUTO: 1.1 %
BILIRUB SERPL-MCNC: 0.6 MG/DL
BILIRUBIN URINE: ABNORMAL
BLOOD URINE: NEGATIVE
BUN SERPL-MCNC: 12 MG/DL
CALCIUM SERPL-MCNC: 10.4 MG/DL
CAST: 16 /LPF
CD3 CELLS # BLD: 1312 CELLS/UL
CD3 CELLS NFR BLD: 67 %
CD3+CD4+ CELLS # BLD: 543 CELLS/UL
CD3+CD4+ CELLS NFR BLD: 28 %
CD3+CD4+ CELLS/CD3+CD8+ CLL SPEC: 0.73 RATIO
CD3+CD8+ CELLS # SPEC: 741 CELLS/UL
CD3+CD8+ CELLS NFR BLD: 38 %
CHLORIDE SERPL-SCNC: 101 MMOL/L
CO2 SERPL-SCNC: 22 MMOL/L
COLOR: NORMAL
CREAT SERPL-MCNC: 1.1 MG/DL
EGFR: 71 ML/MIN/1.73M2
EOSINOPHIL # BLD AUTO: 0.36 K/UL
EOSINOPHIL NFR BLD AUTO: 3.9 %
EPITHELIAL CELLS: 5 /HPF
GLUCOSE QUALITATIVE U: NEGATIVE MG/DL
GLUCOSE SERPL-MCNC: 116 MG/DL
HCT VFR BLD CALC: 44.1 %
HGB BLD-MCNC: 13.8 G/DL
HIV1 RNA # SERPL NAA+PROBE: NORMAL
HIV1 RNA # SERPL NAA+PROBE: NORMAL COPIES/ML
HYALINE CASTS: PRESENT
IMM GRANULOCYTES NFR BLD AUTO: 0.3 %
KETONES URINE: ABNORMAL MG/DL
LEUKOCYTE ESTERASE URINE: ABNORMAL
LYMPHOCYTES # BLD AUTO: 2.26 K/UL
LYMPHOCYTES NFR BLD AUTO: 24.2 %
MAN DIFF?: NORMAL
MCHC RBC-ENTMCNC: 30.9 PG
MCHC RBC-ENTMCNC: 31.3 GM/DL
MCV RBC AUTO: 98.7 FL
MICROSCOPIC-UA: NORMAL
MONOCYTES # BLD AUTO: 0.96 K/UL
MONOCYTES NFR BLD AUTO: 10.3 %
NEUTROPHILS # BLD AUTO: 5.61 K/UL
NEUTROPHILS NFR BLD AUTO: 60.2 %
NITRITE URINE: NEGATIVE
PH URINE: 5.5
PLATELET # BLD AUTO: 303 K/UL
POTASSIUM SERPL-SCNC: 4.7 MMOL/L
PROT SERPL-MCNC: 7.3 G/DL
PROTEIN URINE: 30 MG/DL
PSA FREE FLD-MCNC: 19 %
PSA FREE SERPL-MCNC: 0.02 NG/ML
PSA SERPL-MCNC: 0.09 NG/ML
RBC # BLD: 4.47 M/UL
RBC # FLD: 13 %
RED BLOOD CELLS URINE: 1 /HPF
REVIEW: NORMAL
SODIUM SERPL-SCNC: 138 MMOL/L
SPECIFIC GRAVITY URINE: 1.02
T PALLIDUM AB SER QL IA: NEGATIVE
UROBILINOGEN URINE: 1 MG/DL
VIRAL LOAD INTERP: NORMAL
VIRAL LOAD LOG: NORMAL LG COP/ML
WBC # FLD AUTO: 9.32 K/UL
WHITE BLOOD CELLS URINE: 5 /HPF

## 2023-10-05 ENCOUNTER — TRANSCRIPTION ENCOUNTER (OUTPATIENT)
Age: 73
End: 2023-10-05

## 2023-10-10 ENCOUNTER — TRANSCRIPTION ENCOUNTER (OUTPATIENT)
Age: 73
End: 2023-10-10

## 2023-11-14 ENCOUNTER — TRANSCRIPTION ENCOUNTER (OUTPATIENT)
Age: 73
End: 2023-11-14

## 2023-11-14 ENCOUNTER — RX RENEWAL (OUTPATIENT)
Age: 73
End: 2023-11-14

## 2023-11-22 ENCOUNTER — TRANSCRIPTION ENCOUNTER (OUTPATIENT)
Age: 73
End: 2023-11-22

## 2023-11-22 RX ORDER — LATANOPROST/PF 0.005 %
0.01 DROPS OPHTHALMIC (EYE)
Qty: 2.5 | Refills: 5 | Status: ACTIVE | COMMUNITY
Start: 2021-05-26 | End: 1900-01-01

## 2023-11-22 RX ORDER — BRIMONIDINE TARTRATE, TIMOLOL MALEATE 2; 5 MG/ML; MG/ML
0.2-0.5 SOLUTION/ DROPS TOPICAL
Qty: 1 | Refills: 2 | Status: ACTIVE | COMMUNITY
Start: 1900-01-01 | End: 1900-01-01

## 2023-11-22 RX ORDER — BRIMONIDINE TARTRATE, TIMOLOL MALEATE 2; 5 MG/ML; MG/ML
0.2-0.5 SOLUTION/ DROPS OPHTHALMIC
Qty: 5 | Refills: 5 | Status: ACTIVE | COMMUNITY
Start: 2023-06-02 | End: 1900-01-01

## 2023-11-27 ENCOUNTER — TRANSCRIPTION ENCOUNTER (OUTPATIENT)
Age: 73
End: 2023-11-27

## 2023-11-27 ENCOUNTER — APPOINTMENT (OUTPATIENT)
Dept: AFTER HOURS CARE | Facility: EMERGENCY ROOM | Age: 73
End: 2023-11-27
Payer: MEDICARE

## 2023-11-27 DIAGNOSIS — B02.9 ZOSTER W/OUT COMPLICATIONS: ICD-10-CM

## 2023-11-27 PROCEDURE — 99204 OFFICE O/P NEW MOD 45 MIN: CPT | Mod: 95

## 2023-11-27 RX ORDER — VALACYCLOVIR 1 G/1
1 TABLET, FILM COATED ORAL 3 TIMES DAILY
Qty: 21 | Refills: 0 | Status: ACTIVE | COMMUNITY
Start: 2023-11-27 | End: 1900-01-01

## 2024-01-30 ENCOUNTER — APPOINTMENT (OUTPATIENT)
Dept: OPHTHALMOLOGY | Facility: CLINIC | Age: 74
End: 2024-01-30

## 2024-02-01 ENCOUNTER — RX RENEWAL (OUTPATIENT)
Age: 74
End: 2024-02-01

## 2024-02-01 RX ORDER — ROSUVASTATIN CALCIUM 10 MG/1
10 TABLET, FILM COATED ORAL DAILY
Qty: 90 | Refills: 3 | Status: ACTIVE | COMMUNITY
Start: 2020-12-28 | End: 1900-01-01

## 2024-02-12 ENCOUNTER — TRANSCRIPTION ENCOUNTER (OUTPATIENT)
Age: 74
End: 2024-02-12

## 2024-02-21 ENCOUNTER — RX RENEWAL (OUTPATIENT)
Age: 74
End: 2024-02-21

## 2024-03-05 ENCOUNTER — TRANSCRIPTION ENCOUNTER (OUTPATIENT)
Age: 74
End: 2024-03-05

## 2024-03-07 ENCOUNTER — TRANSCRIPTION ENCOUNTER (OUTPATIENT)
Age: 74
End: 2024-03-07

## 2024-03-11 ENCOUNTER — TRANSCRIPTION ENCOUNTER (OUTPATIENT)
Age: 74
End: 2024-03-11

## 2024-03-13 ENCOUNTER — APPOINTMENT (OUTPATIENT)
Dept: INTERNAL MEDICINE | Facility: CLINIC | Age: 74
End: 2024-03-13
Payer: MEDICARE

## 2024-03-13 DIAGNOSIS — E78.5 HYPERLIPIDEMIA, UNSPECIFIED: ICD-10-CM

## 2024-03-13 DIAGNOSIS — G47.00 INSOMNIA, UNSPECIFIED: ICD-10-CM

## 2024-03-13 PROCEDURE — 99214 OFFICE O/P EST MOD 30 MIN: CPT

## 2024-03-13 RX ORDER — DOCUSATE SODIUM 100 MG/1
100 CAPSULE, LIQUID FILLED ORAL 3 TIMES DAILY
Qty: 90 | Refills: 5 | Status: ACTIVE | COMMUNITY
Start: 2024-03-13 | End: 1900-01-01

## 2024-03-13 RX ORDER — ZOLPIDEM TARTRATE 10 MG/1
10 TABLET ORAL
Qty: 30 | Refills: 5 | Status: ACTIVE | COMMUNITY
Start: 2021-03-12 | End: 1900-01-01

## 2024-03-14 ENCOUNTER — TRANSCRIPTION ENCOUNTER (OUTPATIENT)
Age: 74
End: 2024-03-14

## 2024-03-15 ENCOUNTER — TRANSCRIPTION ENCOUNTER (OUTPATIENT)
Age: 74
End: 2024-03-15

## 2024-03-15 RX ORDER — DOLUTEGRAVIR SODIUM AND RILPIVIRINE HYDROCHLORIDE 50; 25 MG/1; MG/1
50-25 TABLET, FILM COATED ORAL
Qty: 30 | Refills: 5 | Status: ACTIVE | COMMUNITY
Start: 2021-05-05 | End: 1900-01-01

## 2024-03-15 NOTE — HEALTH RISK ASSESSMENT
[2] : 2) Feeling down, depressed, or hopeless for more than half of the days (2) [PHQ-2 Positive] : PHQ-2 Positive [[id number]] : 4 [Never] : Never

## 2024-03-15 NOTE — HISTORY OF PRESENT ILLNESS
[Home] : at home, [unfilled] , at the time of the visit. 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  Current plan is pain management without surgery as the surgery is very comprehensive.  This osteonecrosis is attributed to prostate cancer treatment, patient is inquiring about what medications to take with pain medication for constipation management  Patient with HIV/AIDS, compliant with antiretroviral therapy, denies adverse reactions, denies missed doses.

## 2024-03-15 NOTE — PLAN
[FreeTextEntry1] : Patient has a positive depression score assessment however he is not interested in psychiatric care antidepressants or mental health provider as he attributes his issues to ongoing medical issues along with his chronic diagnosis of prostate cancer  Constipation: Patient counseled on constipation therapy recommend Colace twice daily if this does not control patient's constipation symptoms can add senna tea or Senokot pills.  Recommend increasing fiber intake and physical activity when possible  Insomnia: sleep hygiene counseling given, no caffeine past 2PM, exercise in AM, limit caffeine intake, go to bed at the same night every night

## 2024-03-28 ENCOUNTER — TRANSCRIPTION ENCOUNTER (OUTPATIENT)
Age: 74
End: 2024-03-28

## 2024-04-01 ENCOUNTER — TRANSCRIPTION ENCOUNTER (OUTPATIENT)
Age: 74
End: 2024-04-01

## 2024-04-01 LAB
ALBUMIN SERPL ELPH-MCNC: 4 G/DL
ALP BLD-CCNC: 272 U/L
ALT SERPL-CCNC: 125 U/L
ANION GAP SERPL CALC-SCNC: 13 MMOL/L
AST SERPL-CCNC: 51 U/L
BASOPHILS # BLD AUTO: 0.08 K/UL
BASOPHILS NFR BLD AUTO: 0.9 %
BILIRUB SERPL-MCNC: 0.8 MG/DL
BUN SERPL-MCNC: 10 MG/DL
CALCIUM SERPL-MCNC: 9.5 MG/DL
CD3 CELLS # BLD: 1398 CELLS/UL
CD3 CELLS NFR BLD: 71 %
CD3+CD4+ CELLS # BLD: 576 CELLS/UL
CD3+CD4+ CELLS NFR BLD: 29 %
CD3+CD4+ CELLS/CD3+CD8+ CLL SPEC: 0.72 RATIO
CD3+CD8+ CELLS # SPEC: 796 CELLS/UL
CD3+CD8+ CELLS NFR BLD: 40 %
CHLORIDE SERPL-SCNC: 102 MMOL/L
CHOLEST SERPL-MCNC: 114 MG/DL
CO2 SERPL-SCNC: 23 MMOL/L
CREAT SERPL-MCNC: 1.13 MG/DL
EGFR: 69 ML/MIN/1.73M2
EOSINOPHIL # BLD AUTO: 0.31 K/UL
EOSINOPHIL NFR BLD AUTO: 3.3 %
GLUCOSE SERPL-MCNC: 141 MG/DL
HCT VFR BLD CALC: 40.8 %
HDLC SERPL-MCNC: 35 MG/DL
HGB BLD-MCNC: 13.3 G/DL
HIV1 RNA # SERPL NAA+PROBE: NORMAL
HIV1 RNA # SERPL NAA+PROBE: NORMAL COPIES/ML
IMM GRANULOCYTES NFR BLD AUTO: 0.3 %
LDLC SERPL CALC-MCNC: 58 MG/DL
LYMPHOCYTES # BLD AUTO: 2.14 K/UL
LYMPHOCYTES NFR BLD AUTO: 22.9 %
MAN DIFF?: NORMAL
MCHC RBC-ENTMCNC: 29.8 PG
MCHC RBC-ENTMCNC: 32.6 GM/DL
MCV RBC AUTO: 91.5 FL
MONOCYTES # BLD AUTO: 0.71 K/UL
MONOCYTES NFR BLD AUTO: 7.6 %
NEUTROPHILS # BLD AUTO: 6.07 K/UL
NEUTROPHILS NFR BLD AUTO: 65 %
NONHDLC SERPL-MCNC: 80 MG/DL
PLATELET # BLD AUTO: 346 K/UL
POTASSIUM SERPL-SCNC: 3.6 MMOL/L
PROT SERPL-MCNC: 7 G/DL
RBC # BLD: 4.46 M/UL
RBC # FLD: 13.2 %
SODIUM SERPL-SCNC: 138 MMOL/L
TRIGL SERPL-MCNC: 124 MG/DL
VIRAL LOAD INTERP: NORMAL
VIRAL LOAD LOG: NORMAL LG COP/ML
WBC # FLD AUTO: 9.34 K/UL

## 2024-04-02 ENCOUNTER — TRANSCRIPTION ENCOUNTER (OUTPATIENT)
Age: 74
End: 2024-04-02

## 2024-04-03 ENCOUNTER — TRANSCRIPTION ENCOUNTER (OUTPATIENT)
Age: 74
End: 2024-04-03

## 2024-04-03 NOTE — DISCUSSION/SUMMARY
[FreeTextEntry1] : Impression:\par 1) weather related, probable gastric emptying, or similar event, possibly autonomic in nature.\par \par \par Plan:\par 1) can consider use of mild beta-blocker in attempts to reduce autonomic lability, trial of beta-blocker\par 2) RTC in 6-8 weeks FAMILY HISTORY:  Mother  Still living? Unknown  FH: CHF (congestive heart failure), Age at diagnosis: Age Unknown

## 2024-04-05 ENCOUNTER — TRANSCRIPTION ENCOUNTER (OUTPATIENT)
Age: 74
End: 2024-04-05

## 2024-04-09 ENCOUNTER — TRANSCRIPTION ENCOUNTER (OUTPATIENT)
Age: 74
End: 2024-04-09

## 2024-04-12 ENCOUNTER — TRANSCRIPTION ENCOUNTER (OUTPATIENT)
Age: 74
End: 2024-04-12

## 2024-04-13 ENCOUNTER — APPOINTMENT (OUTPATIENT)
Dept: ULTRASOUND IMAGING | Facility: CLINIC | Age: 74
End: 2024-04-13
Payer: MEDICARE

## 2024-04-13 ENCOUNTER — OUTPATIENT (OUTPATIENT)
Dept: OUTPATIENT SERVICES | Facility: HOSPITAL | Age: 74
LOS: 1 days | End: 2024-04-13

## 2024-04-13 PROCEDURE — 76700 US EXAM ABDOM COMPLETE: CPT | Mod: 26

## 2024-04-19 ENCOUNTER — TRANSCRIPTION ENCOUNTER (OUTPATIENT)
Age: 74
End: 2024-04-19

## 2024-04-23 ENCOUNTER — TRANSCRIPTION ENCOUNTER (OUTPATIENT)
Age: 74
End: 2024-04-23

## 2024-04-24 LAB
ALBUMIN SERPL ELPH-MCNC: 4 G/DL
ALP BLD-CCNC: 101 U/L
ALT SERPL-CCNC: 13 U/L
ANION GAP SERPL CALC-SCNC: 17 MMOL/L
APPEARANCE: ABNORMAL
AST SERPL-CCNC: 17 U/L
BACTERIA: NEGATIVE /HPF
BASOPHILS # BLD AUTO: 0.1 K/UL
BASOPHILS NFR BLD AUTO: 1.4 %
BILIRUB SERPL-MCNC: 0.5 MG/DL
BILIRUBIN URINE: NEGATIVE
BLOOD URINE: NEGATIVE
BUN SERPL-MCNC: 7 MG/DL
CALCIUM SERPL-MCNC: 9.7 MG/DL
CAST: 0 /LPF
CD3 CELLS # BLD: 1457 CELLS/UL
CD3 CELLS NFR BLD: 65 %
CD3+CD4+ CELLS # BLD: 580 CELLS/UL
CD3+CD4+ CELLS NFR BLD: 26 %
CD3+CD4+ CELLS/CD3+CD8+ CLL SPEC: 0.66 RATIO
CD3+CD8+ CELLS # SPEC: 874 CELLS/UL
CD3+CD8+ CELLS NFR BLD: 39 %
CHLORIDE SERPL-SCNC: 99 MMOL/L
CHOLEST SERPL-MCNC: 121 MG/DL
CO2 SERPL-SCNC: 24 MMOL/L
COLOR: YELLOW
CREAT SERPL-MCNC: 1.14 MG/DL
EGFR: 68 ML/MIN/1.73M2
EOSINOPHIL # BLD AUTO: 0.26 K/UL
EOSINOPHIL NFR BLD AUTO: 3.6 %
EPITHELIAL CELLS: 0 /HPF
GLUCOSE QUALITATIVE U: NEGATIVE MG/DL
GLUCOSE SERPL-MCNC: 103 MG/DL
HCT VFR BLD CALC: 41.4 %
HDLC SERPL-MCNC: 39 MG/DL
HGB BLD-MCNC: 13 G/DL
HIV1 RNA # SERPL NAA+PROBE: NORMAL
HIV1 RNA # SERPL NAA+PROBE: NORMAL COPIES/ML
IMM GRANULOCYTES NFR BLD AUTO: 0.1 %
KETONES URINE: NEGATIVE MG/DL
LDLC SERPL CALC-MCNC: 61 MG/DL
LEUKOCYTE ESTERASE URINE: NEGATIVE
LYMPHOCYTES # BLD AUTO: 2.12 K/UL
LYMPHOCYTES NFR BLD AUTO: 29.4 %
MAN DIFF?: NORMAL
MCHC RBC-ENTMCNC: 29.1 PG
MCHC RBC-ENTMCNC: 31.4 GM/DL
MCV RBC AUTO: 92.6 FL
MICROSCOPIC-UA: NORMAL
MONOCYTES # BLD AUTO: 0.62 K/UL
MONOCYTES NFR BLD AUTO: 8.6 %
NEUTROPHILS # BLD AUTO: 4.1 K/UL
NEUTROPHILS NFR BLD AUTO: 56.9 %
NITRITE URINE: NEGATIVE
NONHDLC SERPL-MCNC: 83 MG/DL
PH URINE: 7.5
PLATELET # BLD AUTO: 323 K/UL
POTASSIUM SERPL-SCNC: 4.6 MMOL/L
PROT SERPL-MCNC: 7 G/DL
PROTEIN URINE: NEGATIVE MG/DL
RBC # BLD: 4.47 M/UL
RBC # FLD: 12.9 %
RED BLOOD CELLS URINE: 1 /HPF
SODIUM SERPL-SCNC: 139 MMOL/L
SPECIFIC GRAVITY URINE: 1.01
T PALLIDUM AB SER QL IA: NEGATIVE
TRIGL SERPL-MCNC: 121 MG/DL
UROBILINOGEN URINE: 1 MG/DL
VIRAL LOAD INTERP: NORMAL
VIRAL LOAD LOG: NORMAL LG COP/ML
WBC # FLD AUTO: 7.21 K/UL
WHITE BLOOD CELLS URINE: 1 /HPF

## 2024-04-26 ENCOUNTER — APPOINTMENT (OUTPATIENT)
Dept: INTERNAL MEDICINE | Facility: CLINIC | Age: 74
End: 2024-04-26
Payer: MEDICARE

## 2024-04-26 DIAGNOSIS — M89.9 DISORDER OF BONE, UNSPECIFIED: ICD-10-CM

## 2024-04-26 DIAGNOSIS — K59.00 CONSTIPATION, UNSPECIFIED: ICD-10-CM

## 2024-04-26 DIAGNOSIS — K82.4 CHOLESTEROLOSIS OF GALLBLADDER: ICD-10-CM

## 2024-04-26 DIAGNOSIS — I10 ESSENTIAL (PRIMARY) HYPERTENSION: ICD-10-CM

## 2024-04-26 PROCEDURE — 99215 OFFICE O/P EST HI 40 MIN: CPT

## 2024-04-26 RX ORDER — LOSARTAN POTASSIUM 25 MG/1
25 TABLET, FILM COATED ORAL
Qty: 30 | Refills: 2 | Status: ACTIVE | COMMUNITY
Start: 2022-08-29 | End: 1900-01-01

## 2024-04-26 NOTE — PLAN
[FreeTextEntry1] : 1.  Hypertension Patient counseled on hypertensive disease Likely dehydration and use of opioid medication is contributing to low blood pressure I have advised the patient to cut down the losartan dosage and half to 25 mg daily Recommend frequent blood pressure monitoring and monitoring for symptoms of low blood pressure such as dizziness It is likely that we might have to discontinue the medication completely but given patient currently has a normal blood pressure in the setting of losartan use will only keep it at 50% utilization  2.  Bone lesion Encourage infectious diseases follow-up I have counseled the patient on long-term use of antibiotics and in rare cases can be utilized to keep infections at bay however long-term use of antibiotics can cause resistant issues.  I have advised the patient to try to stay off amoxicillin medication until he is evaluated by infectious diseases as there is a possibility of further culture and tissue sampling however he is very concerned about possibility of getting worsening pain and recollection of the infection in his jaw.  I have prescribed amoxicillin with warnings and caution.  If pain develops and swelling gets bad in the jaw area patient can restart taking the antibiotic as it was prescribed before as he has a close follow-up coming with infection lose  However it is advised to try to wait until he is evaluated.  Him and his partner are also counseled on possible red flag signs such as fever worsening medical condition that would necessitate emergent emergency rooms visit  3 constipation Likely opioid induced I recommend general care to geriatrics given patient's comorbidities but he does not feel ready for this transition yet Utilize senna and Colace 2 times daily Increase fluid hydration Once bowel movements establish can add fiber  4.  Gallbladder polyp Recommend general surgery follow-up

## 2024-04-26 NOTE — HISTORY OF PRESENT ILLNESS
[Home] : at home, [unfilled] , at the time of the visit. [Medical Office: ([facility])___] : at the medical office located in  [Verbal consent obtained from patient] : the patient, [unfilled] [de-identified] : 73-year-old male patient is on televideo to discuss issues relating to his bone lesion which was diagnosed as osteonecrosis/infection,, hypertension and constipation  Patient with hypertensive disease, takes losartan 50 mg daily, recently has revision of his pain medication due to his cancer diagnosis, he has been taking morphine with OxyContin.  Per him and his partner he has not been doing a great job with hydration and had couple of low blood pressure episodes with no dizziness.  He has checked his blood pressure right before the visit and has been normotensive.  Patient today reports he has been taking amoxicillin 500 mg twice daily for quite some time and he ran out of the medication for his jaw necrosis and infection diagnosis.  He reports since he has run out of the medication he has been experiencing collection on his left jaw which is concerning for him in terms of pain is in the past the pain has been unbearable until he started amoxicillin prescription.  He has been difficult time finding infection diseases doctor but he has an upcoming appointment on first week of May with a new infectious diseases doctor.  He is asking if he can continue taking the amoxicillin medication until then.  He denies fevers and chills  Patient also reports he has been experiencing more constipation that has been progressively getting worse, upon questioning he denies being very good with hydration however recently he started using a hydration supplement which he had been utilizing more.  He has also been able to utilize senna and Colace prescription for constipation along with his opioid medication for his cancer pain.

## 2024-04-29 ENCOUNTER — TRANSCRIPTION ENCOUNTER (OUTPATIENT)
Age: 74
End: 2024-04-29

## 2024-05-01 ENCOUNTER — TRANSCRIPTION ENCOUNTER (OUTPATIENT)
Age: 74
End: 2024-05-01

## 2024-05-03 ENCOUNTER — APPOINTMENT (OUTPATIENT)
Dept: INFECTIOUS DISEASE | Facility: CLINIC | Age: 74
End: 2024-05-03
Payer: MEDICARE

## 2024-05-03 PROCEDURE — 99205 OFFICE O/P NEW HI 60 MIN: CPT

## 2024-05-04 RX ORDER — AMOXICILLIN 500 MG/1
500 TABLET, FILM COATED ORAL
Qty: 28 | Refills: 0 | Status: COMPLETED | COMMUNITY
Start: 2024-04-10 | End: 2024-05-04

## 2024-05-04 RX ORDER — ZOSTER VACCINE RECOMBINANT, ADJUVANTED 50 MCG/0.5
50 KIT INTRAMUSCULAR
Qty: 1 | Refills: 0 | Status: COMPLETED | COMMUNITY
Start: 2021-06-02 | End: 2024-05-04

## 2024-05-04 RX ORDER — ZOSTER VACCINE RECOMBINANT, ADJUVANTED 50 MCG/0.5
KIT INTRAMUSCULAR
Qty: 1 | Refills: 0 | Status: COMPLETED | COMMUNITY
Start: 2022-08-29 | End: 2024-05-04

## 2024-05-04 NOTE — ASSESSMENT
[FreeTextEntry1] : 73M h/o metastatic prostate cancer (dx 3/2021, s/p Xgeva c/b L mandible ONJ and acute OM, currently on Lupron), well controlled HIV (VP4=488, VLUD, on DTG/RPV) p/w management of L mandible ONJ and acute OM.   We had extensive discussion about the nature and natural history of ONJ and acute OM.  I discussed about two treatment options: surgery with IV abx which will be curative intent of OM however will have high morbidity risk from extensive surgery, vs conservative management with IV abx only.  If we do conservative management with IV abx only, then patient can avoid morbidity from surgery and IV abx will likely improve QOL by reducing pain from OM and inflammation, and thus he will likely be able to eat more food, although this will not be curative since necrotic bone will remain as nidus of infection and thus infection will recur in the future.  He responded to Amoxicillin so I think IV abx will benefit him.    Patient wishes to avoid surgery since it will be too much for him, and would like to receive IV antibiotics to treat acute OM.  I discussed about risks of IV abx for 6 weeks, including GI side effects (e.g. CDI).  I will first discuss with Dr. Batista and will come up with consensus plan and will call patient early next week.   I will chose IV ceftriaxone for easy administration, and good coverage for most of oral bacteria.  Given patient's comorbidities, he has difficulty moving around, and he requires home care.  Will plan to set up home infusion with [facility].   - will first discuss the plan with Dr. Chago Batista ([facility]) - will plan for ceftriaxone 2g IV q24h for 6 weeks to treat acute OM of L jaw - weekly lab: CBC, CMP, ESR, CRP - Single lumen PICC placement  - [facility] Juan [phone] (patient prefers that all communication to be done through him) - RTC 2-3 weeks  - f/u PMD Dr. Jenkins

## 2024-05-04 NOTE — HISTORY OF PRESENT ILLNESS
[FreeTextEntry1] :  73M h/o metastatic prostate cancer (dx 3/2021, s/p Xgeva c/b L mandible ONJ and acute OM, currently on Lupron), well controlled HIV (KG1=147, VLUD, on DTG/RPV) p/w management of L mandible ONJ and acute OM.  Patient was referred to ID by PMD Dr. Jenkins.    Patient came in with his  Juan (HCP).  Patient was diagnosed with metastatic Prostate cancer in 3/2021 (mets to multiple bones).    His oncologist is Dr. Diego Silva ([place]) and he was started on Lupron (which he is still on) and Xgeva (3/2021-6/2023).  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  Dr. Silva prescribed Amoxicillin to treat superinfection for a few weeks c/b diarrhea so he had to stop it.  He was [place] [facility] but it was difficult to schedule an appointment.  He saw Dr. Brody ([facility]) twice - 9/2023 and 11/2023.  Patient was told to use CHX rinses and was managed conservatively.  He switched care to see Dr. Chago Ellsworth ([facility]) in 3/2024.  He got bone scan on 4/9, which showed acute OM of L jaw.   Per Dr. Ellsworth's note, patient has L mandible with exposed bone on lingual alveolus adjacent to teeth #18/19.  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  Diffuse tenderness and erythema of lingual alveolus.  L lingual torus adjacent to promolar teeth and thin mucosa.  Perio probing buccal/lingual >10mm for teeth #18 and dental implant #19 and BOP.  Dr. Ellsworth recommended surgery, which involved extensive debridement and possible removal of the entire lower jaw with reconstruction (either by using femur bone or prosthetics) along with IV antibiotics.   Given high morbidity risk of the surgery, patient decided not to undergo such extensive surgery.  Dr. Ellsworth referred him to see [facility] ID (Dr. Slade) and gave him Amoxicillin 875mg x 2 weeks course.  However he was unable to get appointment until July.  His L jaw condition got worsened with severe pain.   Patient saw Dr. Hortencia Greene ([facility]) and was started on Oxycodone and Morphine.  Patient saw Dr. Jenkins on 4/26 and he was put on amoxicillin x 10 day course and was referred to ID.   Patient said he has been on Amoxicillin on/off, and most recently he finished the last dose on 4/24.  He thinks his pain significantly improved on Amoxicillin.  No fever/chills, n/v/d, abdominal pain.  he is usually constipated due to pain med if he gets diarrhea whenever he is on Amoxicillin.  Due to L jaw pain, he has difficulty eating, losing weight, and can only chew with R side of jaw.  His prostate cancer is stable and so far well controlled with Lupron.    [de-identified] : retired, theater director including [employer] [de-identified] : Born in [place], came to [place] in 1978   [de-identified] :  [de-identified] :

## 2024-05-04 NOTE — PHYSICAL EXAM
[General Appearance - Alert] : alert [General Appearance - In No Acute Distress] : in no acute distress [Sclera] : the sclera and conjunctiva were normal [Outer Ear] : the ears and nose were normal in appearance [Neck Appearance] : the appearance of the neck was normal [] : no respiratory distress [Auscultation Breath Sounds / Voice Sounds] : lungs were clear to auscultation bilaterally [Heart Rate And Rhythm] : heart rate was normal and rhythm regular [Heart Sounds] : normal S1 and S2 [Bowel Sounds] : normal bowel sounds [Abdomen Soft] : soft [Abdomen Tenderness] : non-tender [FreeTextEntry1] : L mandible with exposed bone on medial aspect of gum

## 2024-05-07 ENCOUNTER — TRANSCRIPTION ENCOUNTER (OUTPATIENT)
Age: 74
End: 2024-05-07

## 2024-05-07 LAB
ALBUMIN SERPL ELPH-MCNC: 4.2 G/DL
ALP BLD-CCNC: 79 U/L
ALT SERPL-CCNC: 7 U/L
ANION GAP SERPL CALC-SCNC: 13 MMOL/L
AST SERPL-CCNC: 23 U/L
BASOPHILS # BLD AUTO: 0.12 K/UL
BASOPHILS NFR BLD AUTO: 1.2 %
BILIRUB SERPL-MCNC: 0.6 MG/DL
BUN SERPL-MCNC: 12 MG/DL
CALCIUM SERPL-MCNC: 10.3 MG/DL
CHLORIDE SERPL-SCNC: 101 MMOL/L
CO2 SERPL-SCNC: 22 MMOL/L
CREAT SERPL-MCNC: 1.14 MG/DL
CRP SERPL-MCNC: 13 MG/L
EGFR: 68 ML/MIN/1.73M2
EOSINOPHIL # BLD AUTO: 0.29 K/UL
EOSINOPHIL NFR BLD AUTO: 3 %
GLUCOSE SERPL-MCNC: 98 MG/DL
HCT VFR BLD CALC: 42.8 %
HGB BLD-MCNC: 13.7 G/DL
IMM GRANULOCYTES NFR BLD AUTO: 0.3 %
LYMPHOCYTES # BLD AUTO: 1.81 K/UL
LYMPHOCYTES NFR BLD AUTO: 18.6 %
MAN DIFF?: NORMAL
MCHC RBC-ENTMCNC: 29 PG
MCHC RBC-ENTMCNC: 32 GM/DL
MCV RBC AUTO: 90.7 FL
MONOCYTES # BLD AUTO: 0.77 K/UL
MONOCYTES NFR BLD AUTO: 7.9 %
NEUTROPHILS # BLD AUTO: 6.71 K/UL
NEUTROPHILS NFR BLD AUTO: 69 %
PLATELET # BLD AUTO: 345 K/UL
POTASSIUM SERPL-SCNC: 4.6 MMOL/L
PROT SERPL-MCNC: 7 G/DL
RBC # BLD: 4.72 M/UL
RBC # FLD: 14 %
SODIUM SERPL-SCNC: 136 MMOL/L
WBC # FLD AUTO: 9.73 K/UL

## 2024-05-08 ENCOUNTER — TRANSCRIPTION ENCOUNTER (OUTPATIENT)
Age: 74
End: 2024-05-08

## 2024-05-10 ENCOUNTER — TRANSCRIPTION ENCOUNTER (OUTPATIENT)
Age: 74
End: 2024-05-10

## 2024-05-12 ENCOUNTER — NON-APPOINTMENT (OUTPATIENT)
Age: 74
End: 2024-05-12

## 2024-05-13 ENCOUNTER — TRANSCRIPTION ENCOUNTER (OUTPATIENT)
Age: 74
End: 2024-05-13

## 2024-05-13 ENCOUNTER — RESULT REVIEW (OUTPATIENT)
Age: 74
End: 2024-05-13

## 2024-05-13 ENCOUNTER — APPOINTMENT (OUTPATIENT)
Dept: INTERVENTIONAL RADIOLOGY/VASCULAR | Facility: HOSPITAL | Age: 74
End: 2024-05-13

## 2024-05-13 ENCOUNTER — NON-APPOINTMENT (OUTPATIENT)
Age: 74
End: 2024-05-13

## 2024-05-13 ENCOUNTER — OUTPATIENT (OUTPATIENT)
Dept: OUTPATIENT SERVICES | Facility: HOSPITAL | Age: 74
LOS: 1 days | End: 2024-05-13
Payer: MEDICARE

## 2024-05-13 PROCEDURE — 36573 INSJ PICC RS&I 5 YR+: CPT

## 2024-05-13 PROCEDURE — C1751: CPT

## 2024-05-14 ENCOUNTER — TRANSCRIPTION ENCOUNTER (OUTPATIENT)
Age: 74
End: 2024-05-14

## 2024-05-17 ENCOUNTER — APPOINTMENT (OUTPATIENT)
Dept: INFECTIOUS DISEASE | Facility: CLINIC | Age: 74
End: 2024-05-17
Payer: MEDICARE

## 2024-05-17 VITALS
HEIGHT: 69 IN | TEMPERATURE: 97.5 F | HEART RATE: 99 BPM | DIASTOLIC BLOOD PRESSURE: 71 MMHG | BODY MASS INDEX: 23.85 KG/M2 | WEIGHT: 161 LBS | OXYGEN SATURATION: 97 % | SYSTOLIC BLOOD PRESSURE: 111 MMHG

## 2024-05-17 PROCEDURE — G2211 COMPLEX E/M VISIT ADD ON: CPT

## 2024-05-17 PROCEDURE — 99214 OFFICE O/P EST MOD 30 MIN: CPT

## 2024-05-17 NOTE — HISTORY OF PRESENT ILLNESS
[FreeTextEntry1] :  73M h/o metastatic prostate cancer (dx 3/2021, s/p Xgeva c/b L mandible ONJ and acute OM, currently on Lupron, now on IV ceftriaxone (5/13-6/23/24), well controlled HIV (WR7=798, VLUD, on DTG/RPV) p/w management of L mandible ONJ and acute OM.   Patient was diagnosed with metastatic Prostate cancer in 3/2021 (mets to multiple bones).    His oncologist is Dr. Diego Silva ([place]) and he was started on Lupron (which he is still on) and Xgeva (3/2021-6/2023).  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  Dr. Silva prescribed Amoxicillin to treat superinfection for a few weeks c/b diarrhea so he had to stop it.  He was referred to [facility] but it was difficult to schedule an appointment.  He saw Dr. Brody ([facility]) twice - 9/2023 and 11/2023.  Patient was told to use CHX rinses and was managed conservatively.  He switched care to see Dr. Chago Ellsworth ([facility]) in 3/2024.  He got bone scan on 4/9, which showed acute OM of L jaw.   Per Dr. Ellsworth's note, patient has L mandible with exposed bone on lingual alveolus adjacent to teeth #18/19.  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  Diffuse tenderness and erythema of lingual alveolus.  L lingual torus adjacent to promolar teeth and thin mucosa.  Perio probing buccal/lingual >10mm for teeth #18 and dental implant #19 and BOP.  Dr. Ellsworth recommended surgery, which involved extensive debridement and possible removal of the entire lower jaw with reconstruction (either by using femur bone or prosthetics) along with IV antibiotics.   Given high morbidity risk of the surgery, patient decided not to undergo such extensive surgery.  Dr. Ellsworth referred him to see [facility] ID (Dr. Slade) and gave him Amoxicillin 875mg x 2 weeks course.  However he was unable to get appointment until July.  His L jaw condition got worsened with severe pain.   Patient saw Dr. Hrotencia Greene ([facility]) and was started on Oxycodone and Morphine.  Patient saw Dr. Jenkins on 4/26 and he was put on amoxicillin x 10 day course and was referred to ID.   He saw me for the first time on 5/3/24, and after risks/benefits discussion and different options, patient decided to proceed with IV abx and not to undergo surgery.  IV ceftriaxone was initiated on 5/13/24 to treat OM.  Interim, patient reported new lump development with drainage from submental area with erythema/pain around 5/10.    Patient came in with his  Juan (HCP).  Patient reports that the lump got much smaller, now half the size and pain resolved on IV abx.  It is crusted and no longer draining.  Denied n/v/d, abdominal pain.  Patient started probiotics and he thinks it is helping him.  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

## 2024-05-17 NOTE — ASSESSMENT
[FreeTextEntry1] :  73M h/o metastatic prostate cancer (dx 3/2021, s/p Xgeva c/b L mandible ONJ and acute OM, currently on Lupron, now on IV ceftriaxone (5/13-6/23/24), well controlled HIV (WY3=996, VLUD, on DTG/RPV) p/w management of L mandible ONJ and acute OM.  Submental lump is likely abscess, and it is best to be drained.  I called Dr. Batista's office but he won't be available until 5/20 so I left my phone number for call back to discuss to see if he can perform I&D.  For now patient is responding well to IV abx and is tolerating.  Since patient developed draining sinus, I have high suspicion that this can be due to actinomyces infection.  Therefore, I think it is best that we switch to PO penicillin therapy after completion of 6 weeks of abx to see if it helps.  I discussed the rationale with patient and HCP and they are in agreement.   - cont ceftriaxone 2g IV q24h  - duration of abx is 6 weeks (5/13 - 6/23/24) to treat acute OM of L jaw - will plan to switch to long term PO penicillin given draining sinus and high suspicion for actinomyces infection  - HCP Juan [phone] (patient prefers that all communication to be done through him) - RTC 2-3 weeks  -  f/u Dr. Chago Batista ([facility]) - f/u PMD Dr. Jaclyn Carl ([place])

## 2024-05-17 NOTE — PHYSICAL EXAM
[General Appearance - Alert] : alert [General Appearance - In No Acute Distress] : in no acute distress [Sclera] : the sclera and conjunctiva were normal [Outer Ear] : the ears and nose were normal in appearance [Neck Appearance] : the appearance of the neck was normal [] : no respiratory distress [Auscultation Breath Sounds / Voice Sounds] : lungs were clear to auscultation bilaterally [Heart Rate And Rhythm] : heart rate was normal and rhythm regular [Heart Sounds] : normal S1 and S2 [Bowel Sounds] : normal bowel sounds [Abdomen Soft] : soft [Abdomen Tenderness] : non-tender [FreeTextEntry1] : PICC on L arm c/d/i

## 2024-05-17 NOTE — DATA REVIEWED
[FreeTextEntry1] : 5/3/24 CRP 13 Cr 1.14 0.73 > 13.7 < 345  ## RADIOLOGY ##  NM Bone scan 4/9/24  IMPRESSION: Increased activity within the bilateral mandibular body, more pronounced on the L side, with overlying soft tissue swelling on SPECT/CT, c/f acute OM.  Cone beam CT 2/20/24 Dentition/Alveolar bone: An ill-defined lytic lesion is present from distal of #18 to mesial of #27.  The lesion is associated with thinning and interruption of the buccal and lingual cortices as well as periosteal reaction along both cortices.  Early stage of small sequestrums formation os observed between Implant in site of #19 and #20.

## 2024-05-21 ENCOUNTER — NON-APPOINTMENT (OUTPATIENT)
Age: 74
End: 2024-05-21

## 2024-05-21 ENCOUNTER — APPOINTMENT (OUTPATIENT)
Dept: INFECTIOUS DISEASE | Facility: CLINIC | Age: 74
End: 2024-05-21

## 2024-06-07 ENCOUNTER — APPOINTMENT (OUTPATIENT)
Dept: INFECTIOUS DISEASE | Facility: CLINIC | Age: 74
End: 2024-06-07
Payer: MEDICARE

## 2024-06-07 DIAGNOSIS — M87.180 OSTEONECROSIS DUE TO DRUGS, JAW: ICD-10-CM

## 2024-06-07 DIAGNOSIS — B20 HUMAN IMMUNODEFICIENCY VIRUS [HIV] DISEASE: ICD-10-CM

## 2024-06-07 PROCEDURE — G2211 COMPLEX E/M VISIT ADD ON: CPT

## 2024-06-07 PROCEDURE — 99214 OFFICE O/P EST MOD 30 MIN: CPT

## 2024-06-07 RX ORDER — CEFTRIAXONE 2 G/1
2 INJECTION, POWDER, FOR SOLUTION INTRAMUSCULAR; INTRAVENOUS
Refills: 0 | Status: ACTIVE | COMMUNITY

## 2024-06-07 RX ORDER — PENICILLIN V POTASSIUM 500 MG/1
500 TABLET, FILM COATED ORAL EVERY 8 HOURS
Qty: 180 | Refills: 2 | Status: ACTIVE | COMMUNITY
Start: 2024-06-07 | End: 1900-01-01

## 2024-06-07 NOTE — REASON FOR VISIT
[Follow-Up: _____] : a [unfilled] follow-up visit [Home] : at home, [unfilled] , at the time of the visit. [Medical Office: ([facility])___] : at the medical office located in  [Spouse] : spouse [Patient] : the patient

## 2024-06-07 NOTE — DATA REVIEWED
[FreeTextEntry1] : 5/30/24  CBC, CMP unremarkable  CRP 12, ESR 23  5/3/24 CRP 13 Cr 1.14 0.73 > 13.7 < 345  ## RADIOLOGY ##  NM Bone scan 4/9/24  IMPRESSION: Increased activity within the bilateral mandibular body, more pronounced on the L side, with overlying soft tissue swelling on SPECT/CT, c/f acute OM.  Cone beam CT 2/20/24 Dentition/Alveolar bone: An ill-defined lytic lesion is present from distal of #18 to mesial of #27.  The lesion is associated with thinning and interruption of the buccal and lingual cortices as well as periosteal reaction along both cortices.  Early stage of small sequestrums formation os observed between Implant in site of #19 and #20.

## 2024-06-07 NOTE — ASSESSMENT
[FreeTextEntry1] :  73M h/o metastatic prostate cancer (dx 3/2021, s/p Xgeva c/b L mandible ONJ and acute OM, currently on Lupron, now on IV ceftriaxone (5/13-6/23/24), well controlled HIV (LA1=738, VLUD, on DTG/RPV) p/w management of L mandible ONJ and acute OM.  Submental lump abscess now almost resolved, and patient is doing well clinically on IV ceftriaxone.  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  I think it is reasonable to switch to PO penicillin for 2-3 months after 6 weeks of IV abx course to reduce the risk of recurrent infection and increase infection free interval, as long as he can tolerate penicillin.  He had diarrhea from Amoxicillin but not from ceftriaxone, so we will see how he does on penicillin.   - cont ceftriaxone 2g IV q24h  - duration of abx is 6 weeks (5/13 - 6/23/24) to treat acute OM of L jaw - on 6/24/24, will switch to penicillin 1g PO q8h for 2-3 months  - HCP Juan [phone] (patient prefers that all communication to be done through him) - RTC 2-3 weeks  -  f/u Dr. Chago Batista ([facility]) - f/u PMD Dr. Jaclyn Carl ([place])

## 2024-06-07 NOTE — HISTORY OF PRESENT ILLNESS
[FreeTextEntry1] :  73M h/o metastatic prostate cancer (dx 3/2021, s/p Xgeva c/b L mandible ONJ and acute OM, currently on Lupron, now on IV ceftriaxone (5/13-6/23/24), well controlled HIV (UG2=128, VLUD, on DTG/RPV) p/w management of L mandible ONJ and acute OM.   Patient was diagnosed with metastatic Prostate cancer in 3/2021 (mets to multiple bones).    His oncologist is Dr. Diego Silva ([facility]) and he was started on Lupron (which he is still on) and Xgeva (3/2021-6/2023).  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  Dr. Silva prescribed Amoxicillin to treat superinfection for a few weeks c/b diarrhea so he had to stop it.  [place] [facility] [place] it was difficult to schedule an appointment.  He saw Dr. Brody ([facility]) twice - 9/2023 and 11/2023.  Patient was told to use CHX rinses and was managed conservatively.  He switched care to see Dr. Chago Ellsworth ([facility]) in 3/2024.  He got bone scan on 4/9, which showed acute OM of L jaw.   Per Dr. Ellsworth's note, patient has L mandible with exposed bone on lingual alveolus adjacent to teeth #18/19.  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  Diffuse tenderness and erythema of lingual alveolus.  L lingual torus adjacent to promolar teeth and thin mucosa.  Perio probing buccal/lingual >10mm for teeth #18 and dental implant #19 and BOP.  Dr. Ellsworth recommended surgery, which involved extensive debridement and possible removal of the entire lower jaw with reconstruction (either by using femur bone or prosthetics) along with IV antibiotics.   Given high morbidity risk of the surgery, patient decided not to undergo such extensive surgery.  Dr. Ellsworth referred him to see [facility] (Dr. Slade) and gave him Amoxicillin 875mg x 2 weeks course.  However he was unable to get appointment until July.  His L jaw condition got worsened with severe pain.   Patient saw Dr. Hortencia Greene ([facility]) and was started on Oxycodone and Morphine.  Patient saw Dr. Jenkins on 4/26 and he was put on amoxicillin x 10 day course and was referred to ID.   He saw me for the first time on 5/3/24, and after risks/benefits discussion and different options, patient decided to proceed with IV abx and not to undergo surgery.  IV ceftriaxone was initiated on 5/13/24 to treat OM.  Interim, patient reported new lump development with drainage from submental area with erythema/pain around 5/10.  During 5/17 follow-up, submental lump was getting smaller and no more pain/drainage.    Today patient reports feeling well.  No more pain/drainage, and the lump at submental area is almost gone, tiny 5mm lump left.  He can eat more and is trying to chew well with R side of his jaw, and avoiding chewing on L side.  He is constipated and has fatigue, and is sleeping more than before.  No n/v/d/abdominal pain.   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

## 2024-06-07 NOTE — PHYSICAL EXAM
[General Appearance - Alert] : alert [General Appearance - In No Acute Distress] : in no acute distress [Sclera] : the sclera and conjunctiva were normal [Outer Ear] : the ears and nose were normal in appearance [Neck Appearance] : the appearance of the neck was normal [FreeTextEntry1] : submental area w/p visible swelling  [] : no respiratory distress

## 2024-06-11 ENCOUNTER — TRANSCRIPTION ENCOUNTER (OUTPATIENT)
Age: 74
End: 2024-06-11

## 2024-06-19 ENCOUNTER — TRANSCRIPTION ENCOUNTER (OUTPATIENT)
Age: 74
End: 2024-06-19

## 2024-06-20 ENCOUNTER — APPOINTMENT (OUTPATIENT)
Dept: INFECTIOUS DISEASE | Facility: CLINIC | Age: 74
End: 2024-06-20
Payer: MEDICARE

## 2024-06-20 DIAGNOSIS — Z79.2 LONG TERM (CURRENT) USE OF ANTIBIOTICS: ICD-10-CM

## 2024-06-20 DIAGNOSIS — R74.01 ELEVATION OF LEVELS OF LIVER TRANSAMINASE LEVELS: ICD-10-CM

## 2024-06-20 DIAGNOSIS — M27.2 INFLAMMATORY CONDITIONS OF JAWS: ICD-10-CM

## 2024-06-20 DIAGNOSIS — C79.51 MALIGNANT NEOPLASM OF PROSTATE: ICD-10-CM

## 2024-06-20 DIAGNOSIS — C61 MALIGNANT NEOPLASM OF PROSTATE: ICD-10-CM

## 2024-06-20 PROCEDURE — 99214 OFFICE O/P EST MOD 30 MIN: CPT

## 2024-06-20 PROCEDURE — G2211 COMPLEX E/M VISIT ADD ON: CPT

## 2024-06-20 NOTE — PHYSICAL EXAM
[General Appearance - Alert] : alert [General Appearance - In No Acute Distress] : in no acute distress [Sclera] : the sclera and conjunctiva were normal [Outer Ear] : the ears and nose were normal in appearance [Neck Appearance] : the appearance of the neck was normal [] : no respiratory distress [FreeTextEntry1] : submental area w/p visible swelling

## 2024-06-20 NOTE — HISTORY OF PRESENT ILLNESS
[FreeTextEntry1] :  73M h/o metastatic prostate cancer (dx 3/2021, s/p Xgeva c/b L mandible ONJ and acute OM, currently on Lupron, now on IV ceftriaxone (5/13-6/23/24), well controlled HIV (GR9=869, VLUD, on DTG/RPV) p/w management of L mandible ONJ and acute OM.   Patient was diagnosed with metastatic Prostate cancer in 3/2021 (mets to multiple bones).    His oncologist is Dr. Diego Silva ([facility]) and he was started on Lupron (which he is still on) and Xgeva (3/2021-6/2023).  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  Dr. Silva prescribed Amoxicillin to treat superinfection for a few weeks c/b diarrhea so he had to stop it.  He was referred to [facility] but it was difficult to schedule an appointment.  He saw Dr. Brody ([facility]) twice - 9/2023 and 11/2023.  Patient was told to use CHX rinses and was managed conservatively.  He switched care to see Dr. Chago Ellsworth ([facility]) in 3/2024.  He got bone scan on 4/9, which showed acute OM of L jaw.   Per Dr. Ellsworth's note, patient has L mandible with exposed bone on lingual alveolus adjacent to teeth #18/19.  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  Diffuse tenderness and erythema of lingual alveolus.  L lingual torus adjacent to promolar teeth and thin mucosa.  Perio probing buccal/lingual >10mm for teeth #18 and dental implant #19 and BOP.  Dr. Ellsworth recommended surgery, which involved extensive debridement and possible removal of the entire lower jaw with reconstruction (either by using femur bone or prosthetics) along with IV antibiotics.   Given high morbidity risk of the surgery, patient decided not to undergo such extensive surgery.  Dr. Ellsworth referred him to see [facility] (Dr. Slade) and gave him Amoxicillin 875mg x 2 weeks course.  However he was unable to get appointment until July.  His L jaw condition got worsened with severe pain.   Patient saw Dr. Hortencia Greene ([facility]) and was started on Oxycodone and Morphine.  Patient saw Dr. Jenkins on 4/26 and he was put on amoxicillin x 10 day course and was referred to ID.   He saw me for the first time on 5/3/24, and after risks/benefits discussion and different options, patient decided to proceed with IV abx and not to undergo surgery.  IV ceftriaxone was initiated on 5/13/24 to treat OM.  Interim, patient reported new lump development with drainage from submental area with erythema/pain around 5/10.  During 5/17 follow-up, submental lump was getting smaller and no more pain/drainage.    Today patient reports doing ok.  He had good day yesterday and so so today.  Reports intermittent difficulty swallowing including liquid.  He has chronic intermittent nausea, not new.  Denied fever/chills, abdominal pain, diarrhea.  No pain/swelling at jaw.  Chewing continues to improve.    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

## 2024-06-20 NOTE — ASSESSMENT
[FreeTextEntry1] :  73M h/o metastatic prostate cancer (dx 3/2021, s/p Xgeva c/b L mandible ONJ and acute OM, currently on Lupron, now on IV ceftriaxone (5/13-6/23/24), well controlled HIV (CW0=755, VLUD, on DTG/RPV) p/w management of L mandible ONJ and acute OM.  He is doing well on ceftriaxone, clinically improving.  Not sure the cause of intermittent dysphagia so if it continues, he should get GI evaluation.  I encouraged him to discuss this matter with Dr. Batista and consider seeing GI.    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 Chago Batista ([facility]) - f/u PMD Dr. Jaclyn Carl ([place])

## 2024-06-20 NOTE — REASON FOR VISIT
[Follow-Up: _____] : a [unfilled] follow-up visit [Home] : at home, [unfilled] , at the time of the visit. [Medical Office: ([facility])___] : at the medical office located in  [Spouse] : spouse [Patient] : the patient

## 2024-06-25 ENCOUNTER — TRANSCRIPTION ENCOUNTER (OUTPATIENT)
Age: 74
End: 2024-06-25

## 2024-06-25 RX ORDER — BRINZOLAMIDE 10 MG/ML
1 SUSPENSION/ DROPS OPHTHALMIC
Qty: 1 | Refills: 11 | Status: ACTIVE | COMMUNITY
Start: 1900-01-01 | End: 1900-01-01

## 2024-07-03 ENCOUNTER — RX RENEWAL (OUTPATIENT)
Age: 74
End: 2024-07-03

## 2024-07-03 ENCOUNTER — NON-APPOINTMENT (OUTPATIENT)
Age: 74
End: 2024-07-03

## 2024-07-18 ENCOUNTER — RX RENEWAL (OUTPATIENT)
Age: 74
End: 2024-07-18

## 2024-08-12 ENCOUNTER — RX RENEWAL (OUTPATIENT)
Age: 74
End: 2024-08-12

## 2024-08-13 ENCOUNTER — TRANSCRIPTION ENCOUNTER (OUTPATIENT)
Age: 74
End: 2024-08-13

## 2024-08-14 PROBLEM — Z79.2 RECEIVING INTRAVENOUS ANTIBIOTIC TREATMENT AS OUTPATIENT: Status: RESOLVED | Noted: 2024-05-17 | Resolved: 2024-08-14

## 2024-08-14 NOTE — REASON FOR VISIT
[Follow-Up: _____] : a [unfilled] follow-up visit [Home] : at home, [unfilled] , at the time of the visit. [Medical Office: ([facility])___] : at the medical office located in  [Spouse] : spouse [Patient] : the patient

## 2024-08-14 NOTE — REASON FOR VISIT
[Follow-Up: _____] : a [unfilled] follow-up visit [Home] : at home, [unfilled] , at the time of the visit. [Medical Office: ([facility])___] : at the medical office located in  [Spouse] : spouse [Patient] : the patient

## 2024-08-15 ENCOUNTER — APPOINTMENT (OUTPATIENT)
Dept: INFECTIOUS DISEASE | Facility: CLINIC | Age: 74
End: 2024-08-15
Payer: MEDICARE

## 2024-08-15 DIAGNOSIS — C61 MALIGNANT NEOPLASM OF PROSTATE: ICD-10-CM

## 2024-08-15 DIAGNOSIS — Z79.2 LONG TERM (CURRENT) USE OF ANTIBIOTICS: ICD-10-CM

## 2024-08-15 DIAGNOSIS — M27.2 INFLAMMATORY CONDITIONS OF JAWS: ICD-10-CM

## 2024-08-15 DIAGNOSIS — M87.180 OSTEONECROSIS DUE TO DRUGS, JAW: ICD-10-CM

## 2024-08-15 DIAGNOSIS — C79.51 MALIGNANT NEOPLASM OF PROSTATE: ICD-10-CM

## 2024-08-15 PROCEDURE — G2211 COMPLEX E/M VISIT ADD ON: CPT

## 2024-08-15 PROCEDURE — 99214 OFFICE O/P EST MOD 30 MIN: CPT

## 2024-08-15 NOTE — HISTORY OF PRESENT ILLNESS
[FreeTextEntry1] :  74M h/o metastatic prostate cancer (dx 3/2021, s/p Xgeva c/b L mandible ONJ and acute OM, currently on Lupron, s/p IV ceftriaxone x 6 weeks (5/13-6/23/24) currently on penicillin VK (6/24/24-), well controlled HIV (TH2=396, VLUD, on DTG/RPV) p/w management of L mandible ONJ and acute OM.   Patient was diagnosed with metastatic Prostate cancer in 3/2021 (mets to multiple bones).    His oncologist is Dr. Diego Silva ([place]) and he was started on Lupron (which he is still on) and Xgeva (3/2021-6/2023).  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  Dr. Silva prescribed Amoxicillin to treat superinfection for a few weeks c/b diarrhea so he had to stop it.  He was referred to [facility] but it was difficult to schedule an appointment.  He saw Dr. Brody ([facility]) twice - 9/2023 and 11/2023.  Patient was told to use CHX rinses and was managed conservatively.  He switched care to see Dr. Chago Ellsworth ([facility]) in 3/2024.  He got bone scan on 4/9, which showed acute OM of L jaw.   Per Dr. Ellsworth's note, patient has L mandible with exposed bone on lingual alveolus adjacent to teeth #18/19.  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  Diffuse tenderness and erythema of lingual alveolus.  L lingual torus adjacent to promolar teeth and thin mucosa.  Perio probing buccal/lingual >10mm for teeth #18 and dental implant #19 and BOP.  Dr. Ellsworth recommended surgery, which involved extensive debridement and possible removal of the entire lower jaw with reconstruction (either by using femur bone or prosthetics) along with IV antibiotics.   Given high morbidity risk of the surgery, patient decided not to undergo such extensive surgery.  Dr. Ellsworth referred him to see [facility] (Dr. Slade) and gave him Amoxicillin 875mg x 2 weeks course.  However he was unable to get appointment until July.  His L jaw condition got worsened with severe pain.   Patient saw Dr. Hortencia Greene ([facility]) and was started on Oxycodone and Morphine.  Patient saw Dr. Jenkins on 4/26 and he was put on amoxicillin x 10 day course and was referred to ID.   He saw me for the first time on 5/3/24, and after risks/benefits discussion and different options, patient decided to proceed with IV abx and not to undergo surgery.  IV ceftriaxone was initiated on 5/13/24 to treat OM.  Interim, patient reported new lump development with drainage from submental area with erythema/pain around 5/10.  During 5/17 follow-up, submental lump was getting smaller and no more pain/drainage.  He was doing well on 6/20 and he completed IV ceftriaxone on 6/23/24 as scheduled.  He started Penicillin on 6/24/24.  Today patient reports some mild jaw swelling has slowly returned but not to the level of pre-IV abx.  He thinks penicillin sometimes has caused n/v.  He takes Morphine and oxycodone twice a day (chronic med, unchanged) and occasionally he takes additional oxy (he didn't have to take any additional oxy while on IV abx).  He wonders if he can take Penicillin q12h instead of q8h since q8h is difficult for him.    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

## 2024-08-15 NOTE — DATA REVIEWED
[FreeTextEntry1] : 6/19/24 CBC, CMP unremarkable   5/30/24  CBC, CMP unremarkable  CRP 12, ESR 23  5/3/24 CRP 13 Cr 1.14 0.73 > 13.7 < 345  ## RADIOLOGY ##  NM Bone scan 4/9/24  IMPRESSION: Increased activity within the bilateral mandibular body, more pronounced on the L side, with overlying soft tissue swelling on SPECT/CT, c/f acute OM.  [facility]one beam CT 2/20/24 Dentition/Alveolar bone: An ill-defined lytic lesion is present from distal of #18 to mesial of #27.  The lesion is associated with thinning and interruption of the buccal and lingual cortices as well as periosteal reaction along both cortices.  Early stage of small sequestrums formation os observed between Implant in site of #19 and #20.

## 2024-08-15 NOTE — ASSESSMENT
[FreeTextEntry1] :  74M h/o metastatic prostate cancer (dx 3/2021, s/p Xgeva c/b L mandible ONJ and acute OM, currently on Lupron, s/p IV ceftriaxone x 6 weeks (5/13-6/23/24) currently on penicillin VK (6/24/24-), well controlled HIV (BE6=988, VLUD, on DTG/RPV) p/w management of L mandible ONJ and acute OM.   Since he has been on Penicillin q8h dosing for >6 weeks, will reduce the dosing to q12h.  If n/v continues to be an issue, then I think he should stop taking Penicillin.  Discussed with him that we will have to assess periodically how he is clinically doing and decide if he will need another course of IV abx, but we would like to have abx free interval as long as possible.  Depending on the clinical course, we may re-consider surgical option as well.  Patient will let me know any clinical change.    - reduce penicillin to 1g PO q12h - duration of penicillin is until around 9/24/24, or until he can no longer tolerate  - [facility] [phone] (patient prefers that all communication to be done through him) - RTC 1 month -  f/u Dr. Chago Batista ([facility]) - f/u PMD Dr. Jaclyn Carl ([place])

## 2024-08-15 NOTE — ASSESSMENT
[FreeTextEntry1] :  74M h/o metastatic prostate cancer (dx 3/2021, s/p Xgeva c/b L mandible ONJ and acute OM, currently on Lupron, s/p IV ceftriaxone x 6 weeks (5/13-6/23/24) currently on penicillin VK (6/24/24-), well controlled HIV (JO8=142, VLUD, on DTG/RPV) p/w management of L mandible ONJ and acute OM.   Since he has been on Penicillin q8h dosing for >6 weeks, will reduce the dosing to q12h.  If n/v continues to be an issue, then I think he should stop taking Penicillin.  Discussed with him that we will have to assess periodically how he is clinically doing and decide if he will need another course of IV abx, but we would like to have abx free interval as long as possible.  Depending on the clinical course, we may re-consider surgical option as well.  Patient will let me know any clinical change.    - reduce penicillin to 1g PO q12h - duration of penicillin is until around 9/24/24, or until he can no longer tolerate  - [facility] [phone] (patient prefers that all communication to be done through him) - RTC 1 month -  f/u Dr. Chago Batista ([facility]) - f/u PMD Dr. Jaclyn Carl ([place])

## 2024-08-15 NOTE — HISTORY OF PRESENT ILLNESS
[FreeTextEntry1] :  74M h/o metastatic prostate cancer (dx 3/2021, s/p Xgeva c/b L mandible ONJ and acute OM, currently on Lupron, s/p IV ceftriaxone x 6 weeks (5/13-6/23/24) currently on penicillin VK (6/24/24-), well controlled HIV (NZ1=426, VLUD, on DTG/RPV) p/w management of L mandible ONJ and acute OM.   Patient was diagnosed with metastatic Prostate cancer in 3/2021 (mets to multiple bones).    His oncologist is Dr. Diego Silva ([place]) and he was started on Lupron (which he is still on) and Xgeva (3/2021-6/2023).  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  Dr. Silva prescribed Amoxicillin to treat superinfection for a few weeks c/b diarrhea so he had to stop it.  He was referred to [facility] but it was difficult to schedule an appointment.  He saw Dr. Brody ([facility]) twice - 9/2023 and 11/2023.  Patient was told to use CHX rinses and was managed conservatively.  He switched care to see Dr. Chago Ellsworth ([facility]) in 3/2024.  He got bone scan on 4/9, which showed acute OM of L jaw.   Per Dr. Ellsworth's note, patient has L mandible with exposed bone on lingual alveolus adjacent to teeth #18/19.  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  Diffuse tenderness and erythema of lingual alveolus.  L lingual torus adjacent to promolar teeth and thin mucosa.  Perio probing buccal/lingual >10mm for teeth #18 and dental implant #19 and BOP.  Dr. Ellsworth recommended surgery, which involved extensive debridement and possible removal of the entire lower jaw with reconstruction (either by using femur bone or prosthetics) along with IV antibiotics.   Given high morbidity risk of the surgery, patient decided not to undergo such extensive surgery.  Dr. Ellsworth referred him to see [facility] (Dr. Slade) and gave him Amoxicillin 875mg x 2 weeks course.  However he was unable to get appointment until July.  His L jaw condition got worsened with severe pain.   Patient saw Dr. Hortencia Greene ([facility]) and was started on Oxycodone and Morphine.  Patient saw Dr. Jenkins on 4/26 and he was put on amoxicillin x 10 day course and was referred to ID.   He saw me for the first time on 5/3/24, and after risks/benefits discussion and different options, patient decided to proceed with IV abx and not to undergo surgery.  IV ceftriaxone was initiated on 5/13/24 to treat OM.  Interim, patient reported new lump development with drainage from submental area with erythema/pain around 5/10.  During 5/17 follow-up, submental lump was getting smaller and no more pain/drainage.  He was doing well on 6/20 and he completed IV ceftriaxone on 6/23/24 as scheduled.  He started Penicillin on 6/24/24.  Today patient reports some mild jaw swelling has slowly returned but not to the level of pre-IV abx.  He thinks penicillin sometimes has caused n/v.  He takes Morphine and oxycodone twice a day (chronic med, unchanged) and occasionally he takes additional oxy (he didn't have to take any additional oxy while on IV abx).  He wonders if he can take Penicillin q12h instead of q8h since q8h is difficult for him.    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

## 2024-08-27 ENCOUNTER — RX RENEWAL (OUTPATIENT)
Age: 74
End: 2024-08-27

## 2024-09-24 ENCOUNTER — TRANSCRIPTION ENCOUNTER (OUTPATIENT)
Age: 74
End: 2024-09-24

## 2024-09-25 ENCOUNTER — TRANSCRIPTION ENCOUNTER (OUTPATIENT)
Age: 74
End: 2024-09-25

## 2024-09-25 ENCOUNTER — RX RENEWAL (OUTPATIENT)
Age: 74
End: 2024-09-25

## 2024-09-27 ENCOUNTER — APPOINTMENT (OUTPATIENT)
Dept: INFECTIOUS DISEASE | Facility: CLINIC | Age: 74
End: 2024-09-27
Payer: MEDICARE

## 2024-09-27 DIAGNOSIS — C79.51 MALIGNANT NEOPLASM OF PROSTATE: ICD-10-CM

## 2024-09-27 DIAGNOSIS — M27.2 INFLAMMATORY CONDITIONS OF JAWS: ICD-10-CM

## 2024-09-27 DIAGNOSIS — C61 MALIGNANT NEOPLASM OF PROSTATE: ICD-10-CM

## 2024-09-27 DIAGNOSIS — M87.180 OSTEONECROSIS DUE TO DRUGS, JAW: ICD-10-CM

## 2024-09-27 PROCEDURE — G2211 COMPLEX E/M VISIT ADD ON: CPT

## 2024-09-27 PROCEDURE — 99214 OFFICE O/P EST MOD 30 MIN: CPT

## 2024-09-27 NOTE — HISTORY OF PRESENT ILLNESS
[FreeTextEntry1] :  74M h/o metastatic prostate cancer (dx 3/2021, s/p Xgeva c/b L mandible ONJ and acute OM, currently on Lupron, s/p IV ceftriaxone x 6 weeks (5/13-6/23/24) s/p penicillin VK (6/24/24-9/24/24), well controlled HIV (QF2=025, VLUD, on DTG/RPV) p/w management of L mandible ONJ and acute OM.   Patient was diagnosed with metastatic Prostate cancer in 3/2021 (mets to multiple bones).    His oncologist is Dr. Diego Silva ([place]) and he was started on Lupron (which he is still on) and Xgeva (3/2021-6/2023).  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  Dr. Silva prescribed Amoxicillin to treat superinfection for a few weeks c/b diarrhea so he had to stop it.  [place] [facility] [place] it was difficult to schedule an appointment.  He saw Dr. Brody ([facility]) twice - 9/2023 and 11/2023.  Patient was told to use CHX rinses and was managed conservatively.  He switched care to see Dr. Chago Ellsworth ([facility]) in 3/2024.  He got bone scan on 4/9, which showed acute OM of L jaw.   Per Dr. Ellsworth's note, patient has L mandible with exposed bone on lingual alveolus adjacent to teeth #18/19.  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  Diffuse tenderness and erythema of lingual alveolus.  L lingual torus adjacent to promolar teeth and thin mucosa.  Perio probing buccal/lingual >10mm for teeth #18 and dental implant #19 and BOP.  Dr. Ellsworth recommended surgery, which involved extensive debridement and possible removal of the entire lower jaw with reconstruction (either by using femur bone or prosthetics) along with IV antibiotics.   Given high morbidity risk of the surgery, patient decided not to undergo such extensive surgery.  Dr. Ellsworth referred him to see [facility] (Dr. Slade) and gave him Amoxicillin 875mg x 2 weeks course.  However he was unable to get appointment until July.  His L jaw condition got worsened with severe pain.   Patient saw Dr. Hortencia Greene ([facility]) and was started on Oxycodone and Morphine.  Patient saw Dr. Jenkins on 4/26 and he was put on amoxicillin x 10 day course and was referred to ID.   He saw me for the first time on 5/3/24, and after risks/benefits discussion and different options, patient decided to proceed with IV abx and not to undergo surgery.  IV ceftriaxone was initiated on 5/13/24 to treat OM.  Interim, patient reported new lump development with drainage from submental area with erythema/pain around 5/10.  During 5/17 follow-up, submental lump was getting smaller and no more pain/drainage.  He was doing well on 6/20 and he completed IV ceftriaxone on 6/23/24 as scheduled.  He started Penicillin on 6/24/24.  During 8/15/24 visit, he reported mild jaw swelling and difficulty taking penicillin q8h and therefore penicillin was switched to q12h dosing, EOT 9/24/24.  Today he reported he has vomiting episode 2-3 times during dinner time over a year, and he thinks this is getting more frequent recently after on abx.  No abdominal pain, diarrhea. He thinks medical marijuana helps. Jaw swelling is much improved (80% improved) and no pain.  Appetite is getting better and he can chew better than before.  No fever.  He sometimes gets sweat in the evening.   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

## 2024-09-27 NOTE — REASON FOR VISIT
[Follow-Up: _____] : a [unfilled] follow-up visit [Home] : at home, [unfilled] , at the time of the visit. [Medical Office: ([facility])___] : at the medical office located in  [Spouse] : spouse [Patient] : the patient

## 2024-09-27 NOTE — PHYSICAL EXAM
[General Appearance - In No Acute Distress] : in no acute distress [General Appearance - Alert] : alert [Sclera] : the sclera and conjunctiva were normal [Outer Ear] : the ears and nose were normal in appearance [Neck Appearance] : the appearance of the neck was normal [] : no respiratory distress [FreeTextEntry1] : submental area w/p visible swelling

## 2024-09-27 NOTE — ASSESSMENT
[FreeTextEntry1] :  74M h/o metastatic prostate cancer (dx 3/2021, s/p Xgeva c/b L mandible ONJ and acute OM, currently on Lupron, s/p IV ceftriaxone x 6 weeks (5/13-6/23/24) s/p penicillin VK (6/24/24-9/24/24), well controlled HIV (OO3=098, VLUD, on DTG/RPV) p/w management of L mandible ONJ and acute OM.   He is off Penicillin for 3 days and so far clinically doing ok.  Jaw swelling has improved, no major jaw pain, is chewing better, eating better.  Will have to see how he does off abx given high risk of recurrence of OM due to persistent L mandible ONJ.  I instructed patient to contact me should he experiences worsening symptoms so that I can assess to see if abx needs to be restarted or not.  - monitor off abx - [facility] [phone] (patient prefers that all communication to be done through him) - RTC 1 month - f/u Dr. Chago Batista ([facility]) - f/u PMD Dr. Jaclyn Carl ([place])

## 2024-10-24 ENCOUNTER — RX RENEWAL (OUTPATIENT)
Age: 74
End: 2024-10-24

## 2025-01-15 ENCOUNTER — RX RENEWAL (OUTPATIENT)
Age: 75
End: 2025-01-15

## 2025-01-17 ENCOUNTER — RX RENEWAL (OUTPATIENT)
Age: 75
End: 2025-01-17

## 2025-02-01 ENCOUNTER — RX RENEWAL (OUTPATIENT)
Age: 75
End: 2025-02-01

## 2025-02-14 ENCOUNTER — RX RENEWAL (OUTPATIENT)
Age: 75
End: 2025-02-14

## 2025-02-17 ENCOUNTER — RX RENEWAL (OUTPATIENT)
Age: 75
End: 2025-02-17

## 2025-03-11 ENCOUNTER — RX RENEWAL (OUTPATIENT)
Age: 75
End: 2025-03-11

## 2025-04-29 ENCOUNTER — TRANSCRIPTION ENCOUNTER (OUTPATIENT)
Age: 75
End: 2025-04-29

## 2025-05-05 ENCOUNTER — APPOINTMENT (OUTPATIENT)
Dept: INTERNAL MEDICINE | Facility: CLINIC | Age: 75
End: 2025-05-05

## 2025-05-05 DIAGNOSIS — Z21 ASYMPTOMATIC HUMAN IMMUNODEFICIENCY VIRUS [HIV] INFECTION STATUS: ICD-10-CM

## 2025-05-05 DIAGNOSIS — G47.00 INSOMNIA, UNSPECIFIED: ICD-10-CM

## 2025-05-05 PROCEDURE — G2211 COMPLEX E/M VISIT ADD ON: CPT | Mod: 2W

## 2025-05-05 PROCEDURE — 99214 OFFICE O/P EST MOD 30 MIN: CPT | Mod: 2W

## 2025-05-06 PROBLEM — Z21 HIV (HUMAN IMMUNODEFICIENCY VIRUS INFECTION): Status: ACTIVE | Noted: 2020-12-30

## 2025-08-02 ENCOUNTER — RX RENEWAL (OUTPATIENT)
Age: 75
End: 2025-08-02

## 2025-08-08 ENCOUNTER — TRANSCRIPTION ENCOUNTER (OUTPATIENT)
Age: 75
End: 2025-08-08

## 2025-08-09 ENCOUNTER — RX RENEWAL (OUTPATIENT)
Age: 75
End: 2025-08-09

## 2025-09-17 ENCOUNTER — RX RENEWAL (OUTPATIENT)
Age: 75
End: 2025-09-17